# Patient Record
Sex: FEMALE | Race: WHITE | NOT HISPANIC OR LATINO | Employment: OTHER | URBAN - METROPOLITAN AREA
[De-identification: names, ages, dates, MRNs, and addresses within clinical notes are randomized per-mention and may not be internally consistent; named-entity substitution may affect disease eponyms.]

---

## 2021-06-27 ENCOUNTER — HOSPITAL ENCOUNTER (EMERGENCY)
Facility: HOSPITAL | Age: 71
Discharge: HOME | End: 2021-06-27
Attending: EMERGENCY MEDICINE
Payer: COMMERCIAL

## 2021-06-27 VITALS
SYSTOLIC BLOOD PRESSURE: 128 MMHG | WEIGHT: 134 LBS | BODY MASS INDEX: 22.88 KG/M2 | OXYGEN SATURATION: 96 % | DIASTOLIC BLOOD PRESSURE: 68 MMHG | TEMPERATURE: 98.5 F | RESPIRATION RATE: 16 BRPM | HEIGHT: 64 IN

## 2021-06-27 DIAGNOSIS — S81.812A LACERATION OF LEFT LOWER EXTREMITY, INITIAL ENCOUNTER: Primary | ICD-10-CM

## 2021-06-27 PROCEDURE — 99282 EMERGENCY DEPT VISIT SF MDM: CPT | Mod: 25

## 2021-06-27 PROCEDURE — 12001 RPR S/N/AX/GEN/TRNK 2.5CM/<: CPT | Performed by: PHYSICIAN ASSISTANT

## 2021-06-27 PROCEDURE — 0JQP0ZZ REPAIR LEFT LOWER LEG SUBCUTANEOUS TISSUE AND FASCIA, OPEN APPROACH: ICD-10-PCS | Performed by: EMERGENCY MEDICINE

## 2021-06-27 ASSESSMENT — ENCOUNTER SYMPTOMS
NUMBNESS: 0
NAUSEA: 0
FEVER: 0

## 2021-06-27 NOTE — ED ATTESTATION NOTE
The patient was evaluated and managed by the physician assistant / nurse practitioner.     Rogelio Hope MD  06/27/21 9930

## 2021-06-27 NOTE — DISCHARGE INSTRUCTIONS
Keep your wound clean and covered.  The wound can get wet after 24 hours.  Gently cleanse the wound daily with mild soap and water.    If you had sutures placed, follow up in 7 days for suture removal.  You can apply petroleum jelly (Vaseline) to the area once or twice a day.     If you had skin adhesive applied, it will come off on its own. Do not scratch, rub, or pick at the adhesive. You do not need to apply any ointment over the wound.    Return with any signs of infection such as redness, swelling, pus around the wound or any fevers.

## 2021-06-27 NOTE — ED PROVIDER NOTES
Emergency Medicine Note  HPI   HISTORY OF PRESENT ILLNESS     72 y/o female arrived via ems for laceration to left lower leg  Reports was shaving and she knicked a varicose vein and could not get it to stop bleeding  Takes baby aspirin daily.  utd on tetanus      Lower Extremity Issue  Associated symptoms: no fever          Patient History   PAST HISTORY     Reviewed from Nursing Triage: Tobacco  Allergies  Meds  Problems  Med Hx  Surg Hx  Fam Hx  Soc Hx        Past Medical History:   Diagnosis Date   • Anxiety    • Hypertension    • Lipid disorder        History reviewed. No pertinent surgical history.    History reviewed. No pertinent family history.    Social History     Tobacco Use   • Smoking status: Former Smoker   • Smokeless tobacco: Never Used   Substance Use Topics   • Alcohol use: Yes   • Drug use: Never         Review of Systems   REVIEW OF SYSTEMS     Review of Systems   Constitutional: Negative for fever.   Gastrointestinal: Negative for nausea.   Neurological: Negative for numbness.         VITALS     ED Vitals    Date/Time Temp Pulse Resp BP SpO2 [facility]   06/27/21 1011 36.9 °C (98.5 °F) -- 18 133/70 95 % LB                       Physical Exam   PHYSICAL EXAM     Physical Exam  Vitals and nursing note reviewed.   Constitutional:       General: She is not in acute distress.     Appearance: Normal appearance. She is normal weight. She is not ill-appearing, toxic-appearing or diaphoretic.   HENT:      Mouth/Throat:      Mouth: Mucous membranes are moist.   Cardiovascular:      Rate and Rhythm: Normal rate.   Pulmonary:      Effort: No respiratory distress.   Musculoskeletal:         General: No tenderness. Normal range of motion.   Skin:     General: Skin is warm.      Comments: Bleeding from varicose vein  Left lateral calf  Slight ooze  nvt intact distally    Neurological:      Mental Status: She is alert and oriented to person, place, and time.           PROCEDURES     Laceration  Repair    Date/Time: 6/27/2021 11:04 AM  Performed by: Tasneem So PA C  Authorized by: Rogelio Hope MD     Consent:     Consent obtained:  Verbal    Consent given by:  Patient    Risks discussed:  Infection, pain, poor cosmetic result and poor wound healing  Anesthesia (see MAR for exact dosages):     Anesthesia method:  Local infiltration    Local anesthetic:  Lidocaine 2% WITH epi  Laceration details:     Location:  Leg    Leg location:  L lower leg    Length (cm):  0.5  Repair type:     Repair type:  Simple  Pre-procedure details:     Preparation:  Patient was prepped and draped in usual sterile fashion  Exploration:     Wound exploration: wound explored through full range of motion and entire depth of wound probed and visualized      Contaminated: no    Treatment:     Area cleansed with:  Betadine and saline    Amount of cleaning:  Standard    Irrigation solution:  Sterile water  Skin repair:     Repair method:  Sutures    Suture size:  5-0    Suture material:  Nylon    Suture technique:  Figure eight    Number of sutures:  1  Approximation:     Approximation:  Close  Post-procedure details:     Dressing:  Antibiotic ointment and non-adherent dressing    Patient tolerance of procedure:  Tolerated well, no immediate complications         DATA     Results     None          Imaging Results    None         No orders to display       Scoring tools                                 ED Course & MDM   MDM / ED COURSE and CLINICAL IMPRESSIONS     MDM    Clinical Impressions as of Jun 27 1104   Laceration of left lower extremity, initial encounter            Tasneem So PA C  06/27/21 1104

## 2022-06-06 NOTE — PROGRESS NOTES
Cardiology  Office Consult Note         Reason for visit:   Chief Complaint   Patient presents with   • Heart Eval       HPI     Rena Ramirez is a 72 y.o. female who presents to the office for cardiovascular evaluation. PMH: HTN and HLD.     She is followed by her PCP Dr. Meek Yarbrough. Last OV 21.     Lipid panel 21 showed TC: 281, TR, HDL: 101, LDL: 166. Lipid panel from 2020 showed TC: 237, TR, HDL: 113, LDL: 111.     Today, her  has accompanied her to the visit. She reports LLE swelling related to an incident in the past. She reports LLE leg cramps related to the Simvastatin and she reports taking COQ10 along with the Simvastatin. Family history reviewed. She denies any chest pain, palpitations, shortness of breath, dizziness, lightheadedness, near syncope or syncopal episodes. She is unsure of her medication dosages.     Past Medical History:   Diagnosis Date   • Anxiety    • Hypertension    • Lipid disorder        Past Surgical History:   Procedure Laterality Date   • REPLACEMENT TOTAL HIP LATERAL POSITION Right    • TOTAL SHOULDER REPLACEMENT Right         Social History     Tobacco Use   Smoking Status Former Smoker   • Packs/day: 1.00   • Years: 13.   • Pack years: 13.   • Quit date:    • Years since quittin.4   Smokeless Tobacco Never Used   Tobacco Comment    smoked socially     Social History     Tobacco Use   • Smoking status: Former Smoker     Packs/day: 1.00     Years: 13.     Pack years: 13.00     Quit date:      Years since quittin.4   • Smokeless tobacco: Never Used   • Tobacco comment: smoked socially   Substance Use Topics   • Alcohol use: Yes   • Drug use: Never       Family History   Problem Relation Age of Onset   • Heart disease Biological Father    • Heart attack Biological Sister    • Heart disease Biological Sister    • Other Biological Sister         PPM   • Heart disease Biological Brother        Allergies:  Sulfa  (sulfonamide antibiotics)    Current Outpatient Medications   Medication Sig Dispense Refill   • cholecalciferol, vitamin D3, 1,000 unit (25 mcg) tablet Take 1,000 Units by mouth daily.     • coenzyme Q10 (CO Q-10) 100 mg capsule Take 100 mg by mouth daily.     • losartan (COZAAR) 25 mg tablet Take 25 mg by mouth daily.     • pantoprazole (PROTONIX) 20 mg EC tablet Take 20 mg by mouth daily.     • simvastatin (ZOCOR) 10 mg tablet Take 10 mg by mouth nightly.       No current facility-administered medications for this visit.       Review of Systems   Cardiovascular: Positive for leg swelling. Negative for chest pain, dyspnea on exertion, irregular heartbeat, near-syncope, palpitations and syncope.   Respiratory: Negative for shortness of breath.    Neurological: Negative for dizziness and light-headedness.       Objective     Vitals:    06/09/22 1028   BP: 118/80   Pulse: 85   SpO2: 97%       Wt Readings from Last 1 Encounters:   06/09/22 62.1 kg (137 lb)       Body surface area is 1.67 meters squared.    Body mass index is 23.52 kg/m².    Physical Exam  Constitutional:       Appearance: Normal appearance.   Neck:      Vascular: No carotid bruit or JVD.   Cardiovascular:      Rate and Rhythm: Normal rate and regular rhythm.      Pulses: Normal pulses.      Heart sounds: Normal heart sounds. No murmur heard.  Pulmonary:      Effort: Pulmonary effort is normal. No respiratory distress.      Breath sounds: Normal breath sounds.   Musculoskeletal:      Left lower leg: Edema present.   Skin:     General: Skin is warm and dry.   Neurological:      General: No focal deficit present.      Mental Status: She is alert and oriented to person, place, and time.           ECG   Sinus  Rhythm   WITHIN NORMAL LIMITS, not significantly changed from ECG from 10/1/2017      Hematology  Lab Results   Component Value Date    WBC 5.72 10/12/2017    HGB 8.2 (L) 10/12/2017    HCT 25.3 (L) 10/12/2017     (H) 10/12/2017    INR 1.0  10/01/2017       Chemistries  Lab Results   Component Value Date     10/12/2017    K 4.1 10/12/2017     10/12/2017    CREATININE 0.6 10/12/2017    BUN 14 10/12/2017    CO2 29 10/12/2017    GLUCOSE 88 10/12/2017    CALCIUM 9.0 10/12/2017    MG 1.9 10/08/2017    ALT 15 10/04/2017    AST 31 10/04/2017       Cholesterol  No results found for: CHOL, TRIG, HDL, LDLCALC, NONHDLCALC    Endocrine  Lab Results   Component Value Date    TSH 1.07 10/12/2017       Assessment/Plan     Other hyperlipidemia  Last lipid profile available to me from 2021 the LDL was 166.  I discussed this finding with patient.  Patient was counseled regarding the importance of lipid control in the process of atherosclerosis and was counseled to follow a cardiac diet.  I discussed with patient importance of decreasing the LDL to prevent cardiovascular events.  She was on higher doses of simvastatin but she developed muscle cramps.  I discussed with patient about switching to atorvastatin and she agrees, based on the LDL level will decide what dose.  A pamphlet from the National Lipid Association with lifestyle tips to decrease the LDL cholesterol was given to patient.    Shortness of breath  She reports occasional shortness of breath with going up the stairs.  I found in her records a history of pulmonary hypertension.  She had a sister that  of a cardiomyopathy.  There are no clinical findings consistent with decompensated heart failure at this time.  Recommend checking an echocardiogram to assess LV function, cardiac valves and pulmonary pressure.    Family history of coronary artery disease  I discussed with patient that this is a cardiac risk factor.  We discussed about heart disease and cardiac risk factors.  Patient was extensively counseled regarding lifestyle changes and risk factor modifications. Patient understands that noncompliance with these recommendations may increase the risk of cardiovascular complications.   Advised patient to minimize the use of alcohol.  Recommend follow a cardiac diet and exercise as tolerated.        Orders Placed This Encounter   Procedures   • Magnesium     Standing Status:   Future     Standing Expiration Date:   6/9/2023     Order Specific Question:   Release to patient     Answer:   Immediate   • Comprehensive metabolic panel     Standing Status:   Future     Standing Expiration Date:   6/9/2023     Order Specific Question:   Release to patient     Answer:   Immediate   • Lipid panel     Standing Status:   Future     Standing Expiration Date:   6/9/2023     Order Specific Question:   Release to patient     Answer:   Immediate   • ECG 12 lead     Scheduling Instructions:      PLEASE USE THIS ORDER FOR ECG'S PERFORMED IN PHYSICIAN OFFICES     Order Specific Question:   Release to patient     Answer:   Immediate   • Transthoracic echo (TTE) complete     With next visit     Standing Status:   Future     Standing Expiration Date:   6/9/2024     Order Specific Question:   Is Definity and/or agitated saline (bubbles) indicated for the study?     Answer:   No     Order Specific Question:   Release to patient     Answer:   Immediate              This note was completed in part utilizing a voice recognition software. Grammatical errors, random word insertion, spelling mistakes, and incomplete sentences may be an occasional consequence of the system secondary to software limitations, ambient noise and hardware issues.   Please read the chart carefully and recognize, using context, where substitutions have occurred. If you have any questions or concerns about the context, text or information contained within the body of this dictation, please contact myself, the provider, for further clarification.     IMeek, am scribing for, and in the presence of, Kana Karimi MD.     IKana MD, personally performed the services described in this documentation as scribed by Meek Mora in my  presence, and it is both accurate and complete.     Kana Karimi MD  6/9/2022

## 2022-06-09 ENCOUNTER — OFFICE VISIT (OUTPATIENT)
Dept: CARDIOLOGY | Facility: CLINIC | Age: 72
End: 2022-06-09
Payer: COMMERCIAL

## 2022-06-09 ENCOUNTER — TELEPHONE (OUTPATIENT)
Dept: CARDIOLOGY | Facility: CLINIC | Age: 72
End: 2022-06-09

## 2022-06-09 VITALS
WEIGHT: 137 LBS | SYSTOLIC BLOOD PRESSURE: 118 MMHG | BODY MASS INDEX: 23.39 KG/M2 | OXYGEN SATURATION: 97 % | DIASTOLIC BLOOD PRESSURE: 80 MMHG | HEART RATE: 85 BPM | HEIGHT: 64 IN

## 2022-06-09 DIAGNOSIS — R06.02 SHORTNESS OF BREATH: ICD-10-CM

## 2022-06-09 DIAGNOSIS — Z82.49 FAMILY HISTORY OF CORONARY ARTERY DISEASE: ICD-10-CM

## 2022-06-09 DIAGNOSIS — E78.49 OTHER HYPERLIPIDEMIA: Primary | ICD-10-CM

## 2022-06-09 PROCEDURE — 3008F BODY MASS INDEX DOCD: CPT | Performed by: INTERNAL MEDICINE

## 2022-06-09 PROCEDURE — 99204 OFFICE O/P NEW MOD 45 MIN: CPT | Performed by: INTERNAL MEDICINE

## 2022-06-09 PROCEDURE — 93000 ELECTROCARDIOGRAM COMPLETE: CPT | Performed by: INTERNAL MEDICINE

## 2022-06-09 RX ORDER — PANTOPRAZOLE SODIUM 20 MG/1
20 TABLET, DELAYED RELEASE ORAL DAILY
COMMUNITY
End: 2023-06-21 | Stop reason: ALTCHOICE

## 2022-06-09 RX ORDER — CHOLECALCIFEROL (VITAMIN D3) 25 MCG
1000 TABLET ORAL DAILY
COMMUNITY
End: 2025-01-08 | Stop reason: ALTCHOICE

## 2022-06-09 RX ORDER — SIMVASTATIN 10 MG/1
10 TABLET, FILM COATED ORAL NIGHTLY
COMMUNITY
End: 2023-01-04

## 2022-06-09 RX ORDER — UBIDECARENONE 100 MG
100 CAPSULE ORAL DAILY
COMMUNITY
End: 2025-01-08 | Stop reason: ALTCHOICE

## 2022-06-09 RX ORDER — LOSARTAN POTASSIUM 25 MG/1
25 TABLET ORAL DAILY
COMMUNITY
End: 2023-06-21 | Stop reason: ALTCHOICE

## 2022-06-09 ASSESSMENT — ENCOUNTER SYMPTOMS
SHORTNESS OF BREATH: 0
DYSPNEA ON EXERTION: 0
SYNCOPE: 0
PALPITATIONS: 0
NEAR-SYNCOPE: 0
DIZZINESS: 0
IRREGULAR HEARTBEAT: 0
LIGHT-HEADEDNESS: 0

## 2022-06-09 NOTE — ASSESSMENT & PLAN NOTE
She reports occasional shortness of breath with going up the stairs.  I found in her records a history of pulmonary hypertension.  She had a sister that  of a cardiomyopathy.  There are no clinical findings consistent with decompensated heart failure at this time.  Recommend checking an echocardiogram to assess LV function, cardiac valves and pulmonary pressure.

## 2022-06-09 NOTE — ASSESSMENT & PLAN NOTE
Last lipid profile available to me from 2/25/2021 the LDL was 166.  I discussed this finding with patient.  Patient was counseled regarding the importance of lipid control in the process of atherosclerosis and was counseled to follow a cardiac diet.  I discussed with patient importance of decreasing the LDL to prevent cardiovascular events.  She was on higher doses of simvastatin but she developed muscle cramps.  I discussed with patient about switching to atorvastatin and she agrees, based on the LDL level will decide what dose.  A pamphlet from the National Lipid Association with lifestyle tips to decrease the LDL cholesterol was given to patient.

## 2022-06-09 NOTE — ASSESSMENT & PLAN NOTE
I discussed with patient that this is a cardiac risk factor.  We discussed about heart disease and cardiac risk factors.  Patient was extensively counseled regarding lifestyle changes and risk factor modifications. Patient understands that noncompliance with these recommendations may increase the risk of cardiovascular complications.  Advised patient to minimize the use of alcohol.  Recommend follow a cardiac diet and exercise as tolerated.

## 2022-06-19 NOTE — TELEPHONE ENCOUNTER
Echo: Added to future files   
Echo: NPR per Plan  
Pt is coming to the [place] office on 7/11 for an Echo dx R06.02  
Statement Selected

## 2022-07-11 ENCOUNTER — HOSPITAL ENCOUNTER (OUTPATIENT)
Dept: CARDIOLOGY | Facility: CLINIC | Age: 72
Discharge: HOME | End: 2022-07-11
Attending: INTERNAL MEDICINE
Payer: COMMERCIAL

## 2022-07-11 VITALS
DIASTOLIC BLOOD PRESSURE: 80 MMHG | SYSTOLIC BLOOD PRESSURE: 118 MMHG | WEIGHT: 137 LBS | HEIGHT: 64 IN | BODY MASS INDEX: 23.39 KG/M2

## 2022-07-11 DIAGNOSIS — R06.02 SHORTNESS OF BREATH: ICD-10-CM

## 2022-07-11 LAB
AORTIC ROOT ANNULUS: 2.7 CM
ASCENDING AORTA: 3 CM
AV PEAK GRADIENT: 5 MMHG
AV PEAK VELOCITY-S: 1.12 M/S
AV VALVE AREA: 2 CM2
BSA FOR ECHO PROCEDURE: 1.68 M2
CUSP SEPARATION: 1.6 CM
E WAVE DECELERATION TIME: 183 MS
E/A RATIO: 0.6
E/E' RATIO: 8.5
E/LAT E' RATIO: 5.8
EDV (BP): 54.5 CM3
EF (A4C): 62.8 %
EF A2C: 60.1 %
EJECTION FRACTION: 61.7 %
EST RIGHT VENT SYSTOLIC PRESSURE BY TRICUSPID REGURGITATION JET: 32 MMHG
ESV (BP): 20.9 CM3
FRACTIONAL SHORTENING: 27 %
INTERVENTRICULAR SEPTUM: 0.87 CM
LA ESV (BP): 40.9 CM3
LA ESV INDEX (A2C): 21.9 CM3/M2
LA ESV INDEX (BP): 24.35 CM3/M2
LA/AORTA RATIO: 1.15
LAAS-AP2: 14.6 CM2
LAAS-AP4: 15.9 CM2
LAD 2D: 3.1 CM
LALD A4C: 4.56 CM
LALD A4C: 4.78 CM
LAV-S: 36.8 CM3
LEFT ATRIUM VOLUME INDEX: 25.89 CM3/M2
LEFT ATRIUM VOLUME: 43.5 CM3
LEFT INTERNAL DIMENSION IN SYSTOLE: 2.92 CM (ref 2.35–3.56)
LEFT VENTRICLE DIASTOLIC VOLUME INDEX: 30.42 CM3/M2
LEFT VENTRICLE DIASTOLIC VOLUME: 51.1 CM3
LEFT VENTRICLE SYSTOLIC VOLUME INDEX: 11.37 CM3/M2
LEFT VENTRICLE SYSTOLIC VOLUME: 19.1 CM3
LEFT VENTRICULAR INTERNAL DIMENSION IN DIASTOLE: 4 CM (ref 3.96–5.5)
LEFT VENTRICULAR POSTERIOR WALL IN END DIASTOLE: 0.88 CM (ref 0.52–0.96)
LV DIASTOLIC VOLUME: 54.7 CM3
LV ESV (APICAL 2 CHAMBER): 21.8 CM3
LVAD-AP2: 21.2 CM2
LVAD-AP4: 20.2 CM2
LVAS-AP2: 12.3 CM2
LVAS-AP4: 11.1 CM2
LVEDVI(A2C): 32.56 CM3/M2
LVEDVI(BP): 32.44 CM3/M2
LVESVI(A2C): 12.98 CM3/M2
LVESVI(BP): 12.44 CM3/M2
LVLD-AP2: 7.09 CM
LVLD-AP4: 6.65 CM
LVLS-AP2: 5.97 CM
LVLS-AP4: 5.65 CM
LVOT 2D: 1.8 CM
LVOT A: 2.54 CM2
LVOT PEAK VELOCITY: 0.88 M/S
MV E'TISSUE VEL-LAT: 0.09 M/S
MV E'TISSUE VEL-MED: 0.06 M/S
MV PEAK A VEL: 0.97 M/S
MV PEAK E VEL: 0.54 M/S
MV VALVE AREA P 1/2 METHOD: 4.07 CM2
POSTERIOR WALL: 0.88 CM
PV PEAK GRADIENT: 7 MMHG
PV PV: 1.34 M/S
RAP: 10 MMHG
RVOT VMAX: 0.64 M/S
SEPTAL TISSUE DOPPLER FREE WALL LATE DIA VELOCITY (APICAL 4 CHAMBER VIEW): 0.11 M/S
TR MAX PG: 29 MMHG
TRICUSPID VALVE PEAK REGURGITATION VELOCITY: 2.68 M/S
Z-SCORE OF LEFT VENTRICULAR DIMENSION IN END DIASTOLE: -1.54
Z-SCORE OF LEFT VENTRICULAR DIMENSION IN END SYSTOLE: 0.07
Z-SCORE OF LEFT VENTRICULAR POSTERIOR WALL IN END DIASTOLE: 1.17

## 2022-07-11 PROCEDURE — 93306 TTE W/DOPPLER COMPLETE: CPT | Performed by: INTERNAL MEDICINE

## 2022-07-11 NOTE — RESULT ENCOUNTER NOTE
Can you please let the patient know that there were no major abnormalities on the echocardiogram.  Her heart function is normal and the pulmonary pressures appear to be normal also.  We will discuss in more detail during our next visit.

## 2022-08-23 ENCOUNTER — APPOINTMENT (EMERGENCY)
Dept: RADIOLOGY | Facility: HOSPITAL | Age: 72
DRG: 914 | End: 2022-08-23
Attending: EMERGENCY MEDICINE
Payer: COMMERCIAL

## 2022-08-23 ENCOUNTER — HOSPITAL ENCOUNTER (INPATIENT)
Facility: HOSPITAL | Age: 72
LOS: 2 days | Discharge: HOME | DRG: 914 | End: 2022-08-25
Attending: EMERGENCY MEDICINE | Admitting: HOSPITALIST
Payer: COMMERCIAL

## 2022-08-23 DIAGNOSIS — S09.90XA HEAD INJURY, INITIAL ENCOUNTER: ICD-10-CM

## 2022-08-23 DIAGNOSIS — J18.9 PNEUMONIA DUE TO INFECTIOUS ORGANISM, UNSPECIFIED LATERALITY, UNSPECIFIED PART OF LUNG: ICD-10-CM
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
## 2022-08-23 PROBLEM — F41.9 ANXIETY: Status: ACTIVE | Noted: 2022-08-23

## 2022-08-23 PROBLEM — I10 PRIMARY HYPERTENSION: Status: ACTIVE | Noted: 2022-08-23

## 2022-08-23 PROBLEM — F10.20 ALCOHOLISM (CMS/HCC): Status: ACTIVE | Noted: 2022-08-23

## 2022-08-23 LAB
ALBUMIN SERPL-MCNC: 3.8 G/DL (ref 3.4–5)
ALP SERPL-CCNC: 73 IU/L (ref 35–126)
ALT SERPL-CCNC: 19 IU/L (ref 11–54)
ANION GAP SERPL CALC-SCNC: 7 MEQ/L (ref 3–15)
ANION GAP SERPL CALC-SCNC: 8 MEQ/L (ref 3–15)
APTT PPP: 25 SEC (ref 23–35)
AST SERPL-CCNC: 29 IU/L (ref 15–41)
ATRIAL RATE: 85
BASOPHILS # BLD: 0.03 K/UL (ref 0.01–0.1)
BASOPHILS NFR BLD: 0.3 %
BILIRUB DIRECT SERPL-MCNC: 0.1 MG/DL
BILIRUB SERPL-MCNC: 0.5 MG/DL (ref 0.3–1.2)
BILIRUB UR QL STRIP.AUTO: NEGATIVE MG/DL
BUN SERPL-MCNC: 13 MG/DL (ref 8–20)
BUN SERPL-MCNC: 14 MG/DL (ref 8–20)
CALCIUM SERPL-MCNC: 9 MG/DL (ref 8.9–10.3)
CALCIUM SERPL-MCNC: 9.1 MG/DL (ref 8.9–10.3)
CHLORIDE SERPL-SCNC: 103 MEQ/L (ref 98–109)
CHLORIDE SERPL-SCNC: 103 MEQ/L (ref 98–109)
CLARITY UR REFRACT.AUTO: CLEAR
CO2 SERPL-SCNC: 23 MEQ/L (ref 22–32)
CO2 SERPL-SCNC: 25 MEQ/L (ref 22–32)
COLOR UR AUTO: COLORLESS
CREAT SERPL-MCNC: 0.7 MG/DL (ref 0.6–1.1)
CREAT SERPL-MCNC: 0.8 MG/DL (ref 0.6–1.1)
DIFFERENTIAL METHOD BLD: ABNORMAL
EOSINOPHIL # BLD: 0.06 K/UL (ref 0.04–0.36)
EOSINOPHIL NFR BLD: 0.5 %
ERYTHROCYTE [DISTWIDTH] IN BLOOD BY AUTOMATED COUNT: 12.4 % (ref 11.7–14.4)
ERYTHROCYTE [DISTWIDTH] IN BLOOD BY AUTOMATED COUNT: 12.4 % (ref 11.7–14.4)
ETHANOL SERPL-MCNC: 153 MG/DL
FOLATE SERPL-MCNC: >20 NG/ML
GFR SERPL CREATININE-BSD FRML MDRD: >60 ML/MIN/1.73M*2
GFR SERPL CREATININE-BSD FRML MDRD: >60 ML/MIN/1.73M*2
GLUCOSE SERPL-MCNC: 100 MG/DL (ref 70–99)
GLUCOSE SERPL-MCNC: 102 MG/DL (ref 70–99)
GLUCOSE UR STRIP.AUTO-MCNC: NEGATIVE MG/DL
HCT VFR BLDCO AUTO: 38.4 % (ref 35–45)
HCT VFR BLDCO AUTO: 38.9 % (ref 35–45)
HGB BLD-MCNC: 12.8 G/DL (ref 11.8–15.7)
HGB BLD-MCNC: 12.9 G/DL (ref 11.8–15.7)
HGB UR QL STRIP.AUTO: NEGATIVE
IMM GRANULOCYTES # BLD AUTO: 0.05 K/UL (ref 0–0.08)
IMM GRANULOCYTES NFR BLD AUTO: 0.4 %
INR PPP: 1.1
KETONES UR STRIP.AUTO-MCNC: NEGATIVE MG/DL
LEUKOCYTE ESTERASE UR QL STRIP.AUTO: NEGATIVE
LIPASE SERPL-CCNC: 27 U/L (ref 20–51)
LYMPHOCYTES # BLD: 1.08 K/UL (ref 1.2–3.5)
LYMPHOCYTES NFR BLD: 9.7 %
MAGNESIUM SERPL-MCNC: 1.9 MG/DL (ref 1.8–2.5)
MCH RBC QN AUTO: 32.6 PG (ref 28–33.2)
MCH RBC QN AUTO: 32.9 PG (ref 28–33.2)
MCHC RBC AUTO-ENTMCNC: 33.2 G/DL (ref 32.2–35.5)
MCHC RBC AUTO-ENTMCNC: 33.3 G/DL (ref 32.2–35.5)
MCV RBC AUTO: 98.2 FL (ref 83–98)
MCV RBC AUTO: 98.7 FL (ref 83–98)
MONOCYTES # BLD: 0.78 K/UL (ref 0.28–0.8)
MONOCYTES NFR BLD: 7 %
NEUTROPHILS # BLD: 9.15 K/UL (ref 1.7–7)
NEUTS SEG NFR BLD: 82.1 %
NITRITE UR QL STRIP.AUTO: NEGATIVE
NRBC BLD-RTO: 0 %
P AXIS: 65
PDW BLD AUTO: 8.8 FL (ref 9.4–12.3)
PDW BLD AUTO: 9 FL (ref 9.4–12.3)
PH UR STRIP.AUTO: 6 [PH]
PLATELET # BLD AUTO: 200 K/UL (ref 150–369)
PLATELET # BLD AUTO: 213 K/UL (ref 150–369)
POTASSIUM SERPL-SCNC: 4 MEQ/L (ref 3.6–5.1)
POTASSIUM SERPL-SCNC: 4 MEQ/L (ref 3.6–5.1)
PR INTERVAL: 152
PROT SERPL-MCNC: 7.7 G/DL (ref 6–8.2)
PROT UR QL STRIP.AUTO: NEGATIVE
PROTHROMBIN TIME: 13.9 SEC (ref 12.2–14.5)
QRS DURATION: 78
QT INTERVAL: 388
QTC CALCULATION(BAZETT): 461
R AXIS: 50
RBC # BLD AUTO: 3.89 M/UL (ref 3.93–5.22)
RBC # BLD AUTO: 3.96 M/UL (ref 3.93–5.22)
SARS-COV-2 RNA RESP QL NAA+PROBE: NEGATIVE
SODIUM SERPL-SCNC: 134 MEQ/L (ref 136–144)
SODIUM SERPL-SCNC: 135 MEQ/L (ref 136–144)
SP GR UR REFRACT.AUTO: 1.01
T WAVE AXIS: 48
TROPONIN I SERPL HS-MCNC: 2.9 PG/ML
TSH SERPL DL<=0.05 MIU/L-ACNC: 1.23 MIU/L (ref 0.34–5.6)
UROBILINOGEN UR STRIP-ACNC: 0.2 EU/DL
VENTRICULAR RATE: 85
VIT B12 SERPL-MCNC: 261 PG/ML (ref 180–914)
WBC # BLD AUTO: 11.15 K/UL (ref 3.8–10.5)
WBC # BLD AUTO: 7.39 K/UL (ref 3.8–10.5)

## 2022-08-23 PROCEDURE — 63700000 HC SELF-ADMINISTRABLE DRUG: Performed by: HOSPITALIST

## 2022-08-23 PROCEDURE — 83735 ASSAY OF MAGNESIUM: CPT | Performed by: HOSPITALIST

## 2022-08-23 PROCEDURE — G0480 DRUG TEST DEF 1-7 CLASSES: HCPCS | Performed by: EMERGENCY MEDICINE

## 2022-08-23 PROCEDURE — 63600000 HC DRUGS/DETAIL CODE: Performed by: HOSPITALIST

## 2022-08-23 PROCEDURE — 85730 THROMBOPLASTIN TIME PARTIAL: CPT | Performed by: EMERGENCY MEDICINE

## 2022-08-23 PROCEDURE — 99223 1ST HOSP IP/OBS HIGH 75: CPT | Performed by: HOSPITALIST

## 2022-08-23 PROCEDURE — 82248 BILIRUBIN DIRECT: CPT | Performed by: EMERGENCY MEDICINE

## 2022-08-23 PROCEDURE — 82652 VIT D 1 25-DIHYDROXY: CPT | Performed by: HOSPITALIST

## 2022-08-23 PROCEDURE — 63700000 HC SELF-ADMINISTRABLE DRUG: Performed by: EMERGENCY MEDICINE

## 2022-08-23 PROCEDURE — 71045 X-RAY EXAM CHEST 1 VIEW: CPT

## 2022-08-23 PROCEDURE — 85610 PROTHROMBIN TIME: CPT | Performed by: EMERGENCY MEDICINE

## 2022-08-23 PROCEDURE — U0002 COVID-19 LAB TEST NON-CDC: HCPCS | Performed by: EMERGENCY MEDICINE

## 2022-08-23 PROCEDURE — 82746 ASSAY OF FOLIC ACID SERUM: CPT | Performed by: HOSPITALIST

## 2022-08-23 PROCEDURE — 72125 CT NECK SPINE W/O DYE: CPT | Mod: ME

## 2022-08-23 PROCEDURE — 82607 VITAMIN B-12: CPT | Performed by: HOSPITALIST

## 2022-08-23 PROCEDURE — 97530 THERAPEUTIC ACTIVITIES: CPT | Mod: GP | Performed by: PHYSICAL THERAPIST

## 2022-08-23 PROCEDURE — 83690 ASSAY OF LIPASE: CPT | Performed by: EMERGENCY MEDICINE

## 2022-08-23 PROCEDURE — 80048 BASIC METABOLIC PNL TOTAL CA: CPT | Performed by: HOSPITALIST

## 2022-08-23 PROCEDURE — 85025 COMPLETE CBC W/AUTO DIFF WBC: CPT | Performed by: EMERGENCY MEDICINE

## 2022-08-23 PROCEDURE — 25800000 HC PHARMACY IV SOLUTIONS: Performed by: HOSPITALIST

## 2022-08-23 PROCEDURE — 84443 ASSAY THYROID STIM HORMONE: CPT | Performed by: HOSPITALIST

## 2022-08-23 PROCEDURE — 80053 COMPREHEN METABOLIC PANEL: CPT | Performed by: EMERGENCY MEDICINE

## 2022-08-23 PROCEDURE — 93005 ELECTROCARDIOGRAM TRACING: CPT | Performed by: EMERGENCY MEDICINE

## 2022-08-23 PROCEDURE — 25000000 HC PHARMACY GENERAL: Performed by: HOSPITALIST

## 2022-08-23 PROCEDURE — 85027 COMPLETE CBC AUTOMATED: CPT | Performed by: HOSPITALIST

## 2022-08-23 PROCEDURE — 84484 ASSAY OF TROPONIN QUANT: CPT | Performed by: EMERGENCY MEDICINE

## 2022-08-23 PROCEDURE — 96374 THER/PROPH/DIAG INJ IV PUSH: CPT | Mod: 59

## 2022-08-23 PROCEDURE — 25800000 HC PHARMACY IV SOLUTIONS: Performed by: EMERGENCY MEDICINE

## 2022-08-23 PROCEDURE — 63600000 HC DRUGS/DETAIL CODE: Performed by: EMERGENCY MEDICINE

## 2022-08-23 PROCEDURE — 87040 BLOOD CULTURE FOR BACTERIA: CPT | Performed by: EMERGENCY MEDICINE

## 2022-08-23 PROCEDURE — 20600000 HC ROOM AND CARE INTERMEDIATE/TELEMETRY

## 2022-08-23 PROCEDURE — 200200 PR NO CHARGE: Performed by: HOSPITALIST

## 2022-08-23 PROCEDURE — 81003 URINALYSIS AUTO W/O SCOPE: CPT | Performed by: HOSPITALIST

## 2022-08-23 PROCEDURE — 70450 CT HEAD/BRAIN W/O DYE: CPT | Mod: ME

## 2022-08-23 PROCEDURE — 97162 PT EVAL MOD COMPLEX 30 MIN: CPT | Mod: GP | Performed by: PHYSICAL THERAPIST

## 2022-08-23 PROCEDURE — 85025 COMPLETE CBC W/AUTO DIFF WBC: CPT

## 2022-08-23 PROCEDURE — 36415 COLL VENOUS BLD VENIPUNCTURE: CPT

## 2022-08-23 PROCEDURE — 93005 ELECTROCARDIOGRAM TRACING: CPT

## 2022-08-23 PROCEDURE — 99285 EMERGENCY DEPT VISIT HI MDM: CPT | Mod: 25

## 2022-08-23 RX ORDER — ARTIFICIAL TEARS 1; 2; 3 MG/ML; MG/ML; MG/ML
2 SOLUTION/ DROPS OPHTHALMIC 3 TIMES DAILY
Status: DISCONTINUED | OUTPATIENT
Start: 2022-08-23 | End: 2022-08-25 | Stop reason: HOSPADM

## 2022-08-23 RX ORDER — CHOLECALCIFEROL (VITAMIN D3) 25 MCG
1000 TABLET ORAL DAILY
Status: DISCONTINUED | OUTPATIENT
Start: 2022-08-23 | End: 2022-08-25 | Stop reason: HOSPADM

## 2022-08-23 RX ORDER — PANTOPRAZOLE SODIUM 40 MG/1
40 TABLET, DELAYED RELEASE ORAL DAILY
Status: DISCONTINUED | OUTPATIENT
Start: 2022-08-23 | End: 2022-08-25 | Stop reason: HOSPADM

## 2022-08-23 RX ORDER — LOSARTAN POTASSIUM 25 MG/1
25 TABLET ORAL DAILY
Status: DISCONTINUED | OUTPATIENT
Start: 2022-08-23 | End: 2022-08-25 | Stop reason: HOSPADM

## 2022-08-23 RX ORDER — PRAVASTATIN SODIUM 20 MG/1
20 TABLET ORAL DAILY
Status: DISCONTINUED | OUTPATIENT
Start: 2022-08-23 | End: 2022-08-25 | Stop reason: HOSPADM

## 2022-08-23 RX ORDER — VIT C/E/ZN/COPPR/LUTEIN/ZEAXAN 250MG-90MG
1000 CAPSULE ORAL DAILY
Status: DISCONTINUED | OUTPATIENT
Start: 2022-08-26 | End: 2022-08-25 | Stop reason: HOSPADM

## 2022-08-23 RX ORDER — ACETAMINOPHEN 325 MG/1
TABLET ORAL
Status: DISPENSED
Start: 2022-08-23 | End: 2022-08-23

## 2022-08-23 RX ORDER — THIAMINE HCL 100 MG
100 TABLET ORAL DAILY
Status: DISCONTINUED | OUTPATIENT
Start: 2022-08-23 | End: 2022-08-23

## 2022-08-23 RX ORDER — ALPRAZOLAM 1 MG/1
1 TABLET ORAL
Status: DISCONTINUED | OUTPATIENT
Start: 2022-08-23 | End: 2022-08-24

## 2022-08-23 RX ORDER — CYANOCOBALAMIN 1000 UG/ML
1000 INJECTION, SOLUTION INTRAMUSCULAR; SUBCUTANEOUS DAILY
Status: COMPLETED | OUTPATIENT
Start: 2022-08-23 | End: 2022-08-25

## 2022-08-23 RX ORDER — THIAMINE HCL 100 MG
100 TABLET ORAL DAILY
Status: DISCONTINUED | OUTPATIENT
Start: 2022-08-26 | End: 2022-08-25 | Stop reason: HOSPADM

## 2022-08-23 RX ORDER — FOLIC ACID 1 MG/1
1 TABLET ORAL DAILY
Status: DISCONTINUED | OUTPATIENT
Start: 2022-08-26 | End: 2022-08-25 | Stop reason: HOSPADM

## 2022-08-23 RX ORDER — ONDANSETRON HYDROCHLORIDE 2 MG/ML
4 INJECTION, SOLUTION INTRAVENOUS EVERY 8 HOURS PRN
Status: DISCONTINUED | OUTPATIENT
Start: 2022-08-23 | End: 2022-08-25

## 2022-08-23 RX ORDER — ACETAMINOPHEN 325 MG/1
650 TABLET ORAL ONCE
Status: COMPLETED | OUTPATIENT
Start: 2022-08-23 | End: 2022-08-23

## 2022-08-23 RX ORDER — ALPRAZOLAM 1 MG/1
1 TABLET ORAL
Status: ON HOLD | COMMUNITY
Start: 2022-06-30 | End: 2022-08-25 | Stop reason: SDUPTHER

## 2022-08-23 RX ORDER — LORAZEPAM 1 MG/1
1 TABLET ORAL EVERY 2 HOUR PRN
Status: DISCONTINUED | OUTPATIENT
Start: 2022-08-23 | End: 2022-08-25

## 2022-08-23 RX ORDER — LORAZEPAM 0.5 MG/1
0.5 TABLET ORAL
Status: DISCONTINUED | OUTPATIENT
Start: 2022-08-23 | End: 2022-08-25

## 2022-08-23 RX ADMIN — PRAVASTATIN SODIUM 20 MG: 20 TABLET ORAL at 18:00

## 2022-08-23 RX ADMIN — CYANOCOBALAMIN 1000 MCG: 1000 INJECTION, SOLUTION INTRAMUSCULAR; SUBCUTANEOUS at 16:31

## 2022-08-23 RX ADMIN — LOSARTAN POTASSIUM 25 MG: 25 TABLET, FILM COATED ORAL at 18:00

## 2022-08-23 RX ADMIN — AZITHROMYCIN MONOHYDRATE 500 MG: 500 INJECTION, POWDER, LYOPHILIZED, FOR SOLUTION INTRAVENOUS at 05:52

## 2022-08-23 RX ADMIN — Medication 1000 UNITS: at 18:00

## 2022-08-23 RX ADMIN — THIAMINE HYDROCHLORIDE 400 MG: 100 INJECTION, SOLUTION INTRAMUSCULAR; INTRAVENOUS at 16:19

## 2022-08-23 RX ADMIN — THIAMINE HYDROCHLORIDE 400 MG: 100 INJECTION, SOLUTION INTRAMUSCULAR; INTRAVENOUS at 23:19

## 2022-08-23 RX ADMIN — FOLIC ACID 1 MG: 5 INJECTION, SOLUTION INTRAMUSCULAR; INTRAVENOUS; SUBCUTANEOUS at 09:10

## 2022-08-23 RX ADMIN — THIAMINE HYDROCHLORIDE 400 MG: 100 INJECTION, SOLUTION INTRAMUSCULAR; INTRAVENOUS at 09:10

## 2022-08-23 RX ADMIN — DEXTRAN 70, GLYCERIN, HYPROMELLOSE 2 DROP: 1; 2; 3 SOLUTION/ DROPS OPHTHALMIC at 16:11

## 2022-08-23 RX ADMIN — CEFTRIAXONE SODIUM 1 G: 1 INJECTION, POWDER, FOR SOLUTION INTRAMUSCULAR; INTRAVENOUS at 05:44

## 2022-08-23 RX ADMIN — PANTOPRAZOLE SODIUM 40 MG: 40 TABLET, DELAYED RELEASE ORAL at 18:00

## 2022-08-23 RX ADMIN — THIAMINE HCL TAB 100 MG 100 MG: 100 TAB at 07:46

## 2022-08-23 RX ADMIN — DEXTRAN 70, GLYCERIN, HYPROMELLOSE 2 DROP: 1; 2; 3 SOLUTION/ DROPS OPHTHALMIC at 19:48

## 2022-08-23 RX ADMIN — ACETAMINOPHEN 650 MG: 325 TABLET ORAL at 05:52

## 2022-08-23 RX ADMIN — DEXTRAN 70, GLYCERIN, HYPROMELLOSE 2 DROP: 1; 2; 3 SOLUTION/ DROPS OPHTHALMIC at 09:23

## 2022-08-23 RX ADMIN — ALPRAZOLAM 1 MG: 1 TABLET ORAL at 18:00

## 2022-08-24 PROCEDURE — 25800000 HC PHARMACY IV SOLUTIONS: Performed by: HOSPITALIST

## 2022-08-24 PROCEDURE — 25000000 HC PHARMACY GENERAL: Performed by: HOSPITALIST

## 2022-08-24 PROCEDURE — 63700000 HC SELF-ADMINISTRABLE DRUG: Performed by: HOSPITALIST

## 2022-08-24 PROCEDURE — 12000000 HC ROOM AND CARE MED/SURG

## 2022-08-24 PROCEDURE — 63600000 HC DRUGS/DETAIL CODE: Performed by: HOSPITALIST

## 2022-08-24 PROCEDURE — 99232 SBSQ HOSP IP/OBS MODERATE 35: CPT | Performed by: HOSPITALIST

## 2022-08-24 RX ORDER — ALPRAZOLAM 0.25 MG/1
0.5 TABLET ORAL 3 TIMES DAILY
Status: DISCONTINUED | OUTPATIENT
Start: 2022-08-24 | End: 2022-08-25 | Stop reason: HOSPADM

## 2022-08-24 RX ORDER — MELATONIN 5 MG
5 CAPSULE ORAL NIGHTLY
Status: DISCONTINUED | OUTPATIENT
Start: 2022-08-24 | End: 2022-08-25 | Stop reason: HOSPADM

## 2022-08-24 RX ORDER — ALPRAZOLAM 1 MG/1
1 TABLET ORAL 3 TIMES DAILY
Status: DISCONTINUED | OUTPATIENT
Start: 2022-08-24 | End: 2022-08-24

## 2022-08-24 RX ORDER — HEPARIN SODIUM 5000 [USP'U]/ML
5000 INJECTION, SOLUTION INTRAVENOUS; SUBCUTANEOUS EVERY 8 HOURS
Status: DISCONTINUED | OUTPATIENT
Start: 2022-08-24 | End: 2022-08-25 | Stop reason: HOSPADM

## 2022-08-24 RX ADMIN — FOLIC ACID 1 MG: 5 INJECTION, SOLUTION INTRAMUSCULAR; INTRAVENOUS; SUBCUTANEOUS at 08:35

## 2022-08-24 RX ADMIN — THIAMINE HYDROCHLORIDE 400 MG: 100 INJECTION, SOLUTION INTRAMUSCULAR; INTRAVENOUS at 05:17

## 2022-08-24 RX ADMIN — ALPRAZOLAM 1 MG: 1 TABLET ORAL at 05:16

## 2022-08-24 RX ADMIN — LOSARTAN POTASSIUM 25 MG: 25 TABLET, FILM COATED ORAL at 08:27

## 2022-08-24 RX ADMIN — ALPRAZOLAM 0.5 MG: 0.25 TABLET ORAL at 21:29

## 2022-08-24 RX ADMIN — Medication 5 MG: at 21:28

## 2022-08-24 RX ADMIN — THIAMINE HYDROCHLORIDE 400 MG: 100 INJECTION, SOLUTION INTRAMUSCULAR; INTRAVENOUS at 21:27

## 2022-08-24 RX ADMIN — THIAMINE HYDROCHLORIDE 400 MG: 100 INJECTION, SOLUTION INTRAMUSCULAR; INTRAVENOUS at 15:44

## 2022-08-24 RX ADMIN — DEXTRAN 70, GLYCERIN, HYPROMELLOSE 2 DROP: 1; 2; 3 SOLUTION/ DROPS OPHTHALMIC at 15:44

## 2022-08-24 RX ADMIN — DEXTRAN 70, GLYCERIN, HYPROMELLOSE 2 DROP: 1; 2; 3 SOLUTION/ DROPS OPHTHALMIC at 21:27

## 2022-08-24 RX ADMIN — CYANOCOBALAMIN 1000 MCG: 1000 INJECTION, SOLUTION INTRAMUSCULAR; SUBCUTANEOUS at 08:27

## 2022-08-24 RX ADMIN — PANTOPRAZOLE SODIUM 40 MG: 40 TABLET, DELAYED RELEASE ORAL at 08:27

## 2022-08-24 RX ADMIN — Medication 1000 UNITS: at 08:27

## 2022-08-24 RX ADMIN — DEXTRAN 70, GLYCERIN, HYPROMELLOSE 2 DROP: 1; 2; 3 SOLUTION/ DROPS OPHTHALMIC at 08:35

## 2022-08-24 RX ADMIN — ALPRAZOLAM 0.5 MG: 0.25 TABLET ORAL at 15:44

## 2022-08-24 RX ADMIN — PRAVASTATIN SODIUM 20 MG: 20 TABLET ORAL at 08:27

## 2022-08-24 RX ADMIN — LORAZEPAM 1 MG: 1 TABLET ORAL at 00:13

## 2022-08-25 VITALS
RESPIRATION RATE: 18 BRPM | SYSTOLIC BLOOD PRESSURE: 145 MMHG | HEART RATE: 100 BPM | TEMPERATURE: 98.1 F | HEIGHT: 64 IN | DIASTOLIC BLOOD PRESSURE: 90 MMHG | OXYGEN SATURATION: 99 % | BODY MASS INDEX: 25.95 KG/M2 | WEIGHT: 152 LBS

## 2022-08-25 LAB
ANION GAP SERPL CALC-SCNC: 7 MEQ/L (ref 3–15)
BUN SERPL-MCNC: 17 MG/DL (ref 8–20)
CALCIUM SERPL-MCNC: 9.1 MG/DL (ref 8.9–10.3)
CHLORIDE SERPL-SCNC: 101 MEQ/L (ref 98–109)
CO2 SERPL-SCNC: 28 MEQ/L (ref 22–32)
CREAT SERPL-MCNC: 0.7 MG/DL (ref 0.6–1.1)
ERYTHROCYTE [DISTWIDTH] IN BLOOD BY AUTOMATED COUNT: 12.5 % (ref 11.7–14.4)
GFR SERPL CREATININE-BSD FRML MDRD: >60 ML/MIN/1.73M*2
GLUCOSE SERPL-MCNC: 99 MG/DL (ref 70–99)
HCT VFR BLDCO AUTO: 39 % (ref 35–45)
HGB BLD-MCNC: 12.9 G/DL (ref 11.8–15.7)
MAGNESIUM SERPL-MCNC: 1.9 MG/DL (ref 1.8–2.5)
MCH RBC QN AUTO: 33.6 PG (ref 28–33.2)
MCHC RBC AUTO-ENTMCNC: 33.1 G/DL (ref 32.2–35.5)
MCV RBC AUTO: 101.6 FL (ref 83–98)
PDW BLD AUTO: 9.1 FL (ref 9.4–12.3)
PLATELET # BLD AUTO: 213 K/UL (ref 150–369)
POTASSIUM SERPL-SCNC: 3.9 MEQ/L (ref 3.6–5.1)
RBC # BLD AUTO: 3.84 M/UL (ref 3.93–5.22)
SODIUM SERPL-SCNC: 136 MEQ/L (ref 136–144)
WBC # BLD AUTO: 5.07 K/UL (ref 3.8–10.5)

## 2022-08-25 PROCEDURE — 36415 COLL VENOUS BLD VENIPUNCTURE: CPT | Performed by: HOSPITALIST

## 2022-08-25 PROCEDURE — 85027 COMPLETE CBC AUTOMATED: CPT | Performed by: HOSPITALIST

## 2022-08-25 PROCEDURE — 97166 OT EVAL MOD COMPLEX 45 MIN: CPT | Mod: GO

## 2022-08-25 PROCEDURE — 80048 BASIC METABOLIC PNL TOTAL CA: CPT | Performed by: HOSPITALIST

## 2022-08-25 PROCEDURE — 99239 HOSP IP/OBS DSCHRG MGMT >30: CPT | Performed by: HOSPITALIST

## 2022-08-25 PROCEDURE — 25000000 HC PHARMACY GENERAL: Performed by: HOSPITALIST

## 2022-08-25 PROCEDURE — 25800000 HC PHARMACY IV SOLUTIONS: Performed by: HOSPITALIST

## 2022-08-25 PROCEDURE — 63700000 HC SELF-ADMINISTRABLE DRUG: Performed by: HOSPITALIST

## 2022-08-25 PROCEDURE — 63600000 HC DRUGS/DETAIL CODE: Performed by: HOSPITALIST

## 2022-08-25 PROCEDURE — 83735 ASSAY OF MAGNESIUM: CPT | Performed by: HOSPITALIST

## 2022-08-25 PROCEDURE — 97535 SELF CARE MNGMENT TRAINING: CPT | Mod: GO

## 2022-08-25 RX ORDER — FOLIC ACID 1 MG/1
1 TABLET ORAL DAILY
Qty: 30 TABLET | Refills: 0
Start: 2022-08-26 | End: 2023-10-04 | Stop reason: ALTCHOICE

## 2022-08-25 RX ORDER — LANOLIN ALCOHOL/MO/W.PET/CERES
100 CREAM (GRAM) TOPICAL DAILY
Qty: 30 TABLET | Refills: 0
Start: 2022-08-25 | End: 2024-04-10 | Stop reason: ALTCHOICE

## 2022-08-25 RX ORDER — MELATONIN 5 MG
5 CAPSULE ORAL NIGHTLY
Qty: 30 CAPSULE | Refills: 0
Start: 2022-08-25 | End: 2023-06-21 | Stop reason: ALTCHOICE

## 2022-08-25 RX ORDER — LANOLIN ALCOHOL/MO/W.PET/CERES
1000 CREAM (GRAM) TOPICAL DAILY
Qty: 30 TABLET | Refills: 0
Start: 2022-08-26 | End: 2024-06-21

## 2022-08-25 RX ORDER — ALPRAZOLAM 1 MG/1
0.5 TABLET ORAL
Qty: 8 TABLET | Refills: 0
Start: 2022-08-25 | End: 2022-08-30

## 2022-08-25 RX ORDER — ARTIFICIAL TEARS 1; 2; 3 MG/ML; MG/ML; MG/ML
2 SOLUTION/ DROPS OPHTHALMIC 3 TIMES DAILY
Qty: 9 ML | Refills: 0
Start: 2022-08-25 | End: 2023-06-21 | Stop reason: ALTCHOICE

## 2022-08-25 RX ADMIN — PRAVASTATIN SODIUM 20 MG: 20 TABLET ORAL at 08:24

## 2022-08-25 RX ADMIN — ALPRAZOLAM 0.5 MG: 0.25 TABLET ORAL at 10:56

## 2022-08-25 RX ADMIN — LOSARTAN POTASSIUM 25 MG: 25 TABLET, FILM COATED ORAL at 08:24

## 2022-08-25 RX ADMIN — Medication 1000 UNITS: at 08:21

## 2022-08-25 RX ADMIN — THIAMINE HYDROCHLORIDE 400 MG: 100 INJECTION, SOLUTION INTRAMUSCULAR; INTRAVENOUS at 05:37

## 2022-08-25 RX ADMIN — DEXTRAN 70, GLYCERIN, HYPROMELLOSE 2 DROP: 1; 2; 3 SOLUTION/ DROPS OPHTHALMIC at 08:24

## 2022-08-25 RX ADMIN — DEXTRAN 70, GLYCERIN, HYPROMELLOSE 2 DROP: 1; 2; 3 SOLUTION/ DROPS OPHTHALMIC at 14:46

## 2022-08-25 RX ADMIN — THIAMINE HYDROCHLORIDE 400 MG: 100 INJECTION, SOLUTION INTRAMUSCULAR; INTRAVENOUS at 14:45

## 2022-08-25 RX ADMIN — FOLIC ACID 1 MG: 5 INJECTION, SOLUTION INTRAMUSCULAR; INTRAVENOUS; SUBCUTANEOUS at 08:25

## 2022-08-25 RX ADMIN — CYANOCOBALAMIN 1000 MCG: 1000 INJECTION, SOLUTION INTRAMUSCULAR; SUBCUTANEOUS at 08:25

## 2022-08-25 RX ADMIN — PANTOPRAZOLE SODIUM 40 MG: 40 TABLET, DELAYED RELEASE ORAL at 08:23

## 2022-08-26 LAB
1,25(OH)2D SERPL-MCNC: 36 PG/ML (ref 18–72)
1,25(OH)2D2 SERPL-MCNC: <8 PG/ML
1,25(OH)2D3 SERPL-MCNC: 36 PG/ML

## 2022-08-28 LAB
BACTERIA BLD CULT: NORMAL
BACTERIA BLD CULT: NORMAL

## 2023-01-04 ENCOUNTER — OFFICE VISIT (OUTPATIENT)
Dept: CARDIOLOGY | Facility: CLINIC | Age: 73
End: 2023-01-04
Payer: COMMERCIAL

## 2023-01-04 VITALS
BODY MASS INDEX: 24.24 KG/M2 | HEART RATE: 91 BPM | SYSTOLIC BLOOD PRESSURE: 139 MMHG | WEIGHT: 142 LBS | DIASTOLIC BLOOD PRESSURE: 85 MMHG | HEIGHT: 64 IN | OXYGEN SATURATION: 98 %

## 2023-01-04 DIAGNOSIS — E78.49 OTHER HYPERLIPIDEMIA: Primary | ICD-10-CM

## 2023-01-04 DIAGNOSIS — I49.3 PVC'S (PREMATURE VENTRICULAR CONTRACTIONS): ICD-10-CM

## 2023-01-04 DIAGNOSIS — I10 PRIMARY HYPERTENSION: ICD-10-CM

## 2023-01-04 DIAGNOSIS — R06.02 SHORTNESS OF BREATH: ICD-10-CM

## 2023-01-04 DIAGNOSIS — F10.20 ALCOHOLISM (CMS/HCC): ICD-10-CM

## 2023-01-04 PROCEDURE — 99214 OFFICE O/P EST MOD 30 MIN: CPT | Performed by: INTERNAL MEDICINE

## 2023-01-04 PROCEDURE — 3008F BODY MASS INDEX DOCD: CPT | Performed by: INTERNAL MEDICINE

## 2023-01-04 PROCEDURE — 93000 ELECTROCARDIOGRAM COMPLETE: CPT | Performed by: INTERNAL MEDICINE

## 2023-01-04 RX ORDER — ATORVASTATIN CALCIUM 10 MG/1
10 TABLET, FILM COATED ORAL DAILY
Qty: 90 TABLET | Refills: 1 | Status: SHIPPED | OUTPATIENT
Start: 2023-01-04 | End: 2023-06-21 | Stop reason: SDUPTHER

## 2023-01-04 ASSESSMENT — ENCOUNTER SYMPTOMS
NEAR-SYNCOPE: 0
SHORTNESS OF BREATH: 0
IRREGULAR HEARTBEAT: 0
DIZZINESS: 0
PALPITATIONS: 0
SYNCOPE: 0
LIGHT-HEADEDNESS: 0
DYSPNEA ON EXERTION: 1

## 2023-01-04 NOTE — PROGRESS NOTES
Cardiology  Office Progress Note         Reason for visit:   Chief Complaint   Patient presents with   • Follow-up       HPI     Rena Ramirez is a 72 y.o. female who presents to the office for cardiovascular follow up and management of other HLD, shortness of breath, and family history of CAD.     She was last seen in our office on 22 for an initial evaluation. I discussed with her about switching to Atorvastatin and she agrees, based on the LDL level will decide what dose. I planned for her to have an echo. I did not make any changes to her medical regimen. She was to follow up in 6 months.     -, she was in  for a fall.     Today, her  has accompanied her to the visit. She reports shortness of breath with going up 16 steps. She notes LLE swelling from previous injury/surgery to that leg. She denies any recent falls since her most recent hospitalization. She reports she is still taking the Simvastatin. She denies any chest pain, palpitations, dizziness, lightheadedness, near syncope or syncopal episodes.     Past Medical History:   Diagnosis Date   • Alcoholism (CMS/[facility])    • Anxiety    • Hypertension    • Lipid disorder        Past Surgical History:   Procedure Laterality Date   • REPLACEMENT TOTAL HIP LATERAL POSITION Right    • TOTAL SHOULDER REPLACEMENT Right        Social History     Tobacco Use   • Smoking status: Former     Packs/day: 1.00     Years: 13.00     Pack years: 13.00     Types: Cigarettes     Quit date:      Years since quittin.0   • Smokeless tobacco: Never   • Tobacco comments:     smoked socially   Vaping Use   • Vaping Use: Never used   Substance Use Topics   • Alcohol use: Yes     Alcohol/week: 21.0 standard drinks     Types: 21 Glasses of wine per week     Comment: Drinks 2-3 glasses of white wine daily since she was in her early 50s   • Drug use: Never       Family History   Problem Relation Age of Onset   • Heart disease Biological Father    • Heart  attack Biological Sister    • Heart disease Biological Sister    • Other Biological Sister         PPM   • Heart disease Biological Brother        Allergies:  Morphine and Sulfa (sulfonamide antibiotics)    Current Outpatient Medications   Medication Sig Dispense Refill   • artificial tears, dextran-hpm-glyc, (GENTEAL TEARS) 0.1-0.3-0.2 % drops eye drops Administer 2 drops into both eyes 3 (three) times a day. 9 mL 0   • atorvastatin (LIPITOR) 10 mg tablet Take 1 tablet (10 mg total) by mouth daily. 90 tablet 1   • cholecalciferol, vitamin D3, 1,000 unit (25 mcg) tablet Take 1,000 Units by mouth daily.     • cyanocobalamin (VITAMIN B12) 1,000 mcg tablet Take 1 tablet (1,000 mcg total) by mouth daily. 30 tablet 0   • folic acid (FOLVITE) 1 mg tablet Take 1 tablet (1 mg total) by mouth daily. 30 tablet 0   • losartan (COZAAR) 25 mg tablet Take 25 mg by mouth daily.     • melatonin 5 mg capsule Take 1 capsule (5 mg total) by mouth nightly. 30 capsule 0   • pantoprazole (PROTONIX) 20 mg EC tablet Take 20 mg by mouth daily.     • thiamine 100 mg tablet Take 1 tablet (100 mg total) by mouth daily. 30 tablet 0   • coenzyme Q10 (COQ10) 100 mg capsule Take 100 mg by mouth daily.       No current facility-administered medications for this visit.       Review of Systems   Cardiovascular: Positive for dyspnea on exertion. Negative for chest pain, irregular heartbeat, leg swelling, near-syncope, palpitations and syncope.   Respiratory: Negative for shortness of breath.    Neurological: Negative for dizziness and light-headedness.       Objective     Vitals:    01/04/23 1423   BP: 139/85   Pulse: 91   SpO2: 98%       Wt Readings from Last 3 Encounters:   01/04/23 64.4 kg (142 lb)   08/23/22 68.9 kg (152 lb)   07/11/22 62.1 kg (137 lb)       Body mass index is 24.37 kg/m².  Body surface area is 1.71 meters squared.    Physical Exam  Constitutional:       Appearance: Normal appearance.   Neck:      Vascular: No carotid bruit or  JVD.   Cardiovascular:      Rate and Rhythm: Normal rate and regular rhythm.      Pulses: Normal pulses.      Heart sounds: Normal heart sounds. No murmur heard.  Pulmonary:      Effort: Pulmonary effort is normal. No respiratory distress.      Breath sounds: Normal breath sounds.   Musculoskeletal:         General: No swelling.      Right lower leg: No edema.      Left lower leg: Edema present.   Skin:     General: Skin is warm and dry.   Neurological:      General: No focal deficit present.      Mental Status: She is alert and oriented to person, place, and time.         ECG   Sinus  Rhythm  - 2 PVCs  WITHIN NORMAL LIMITS    Cardiac Imaging    TRANSTHORACIC ECHO (TTE) COMPLETE 07/11/2022    Interpretation Summary  · Normal-sized LV. Normal LV systolic function. Estimated EF 60- 65%. Wall motion appears grossly normal. Normal LV wall thickness. Normal diastolic filling pattern for age of the LV.  · Normal-sized LA.  · No evidence of pericardial effusion.  · Tricuspid valve structure is grossly normal. Mild to moderate tricuspid valve regurgitation.  · Estimated RVSP = 32 mmHg (assuming a right atrial pressure of 3 mmHg).  · Grossly normal pulmonic valve structure. Trace pulmonic valve regurgitation. No pulmonic valve stenosis.  · Tricuspid aortic valve. No aortic valve regurgitation. No aortic valve stenosis.  · Normal-sized RV. Normal RV systolic function.  · Normal-sized RA.  · Grossly normal leaflet structure of the mitral valve.  · No significant mitral valve regurgitation.  · No significant mitral valve stenosis.  · IVC not well visualized.  · No previous echocardiogram available for comparison.      Hematology  Lab Results   Component Value Date    WBC 5.07 08/25/2022    HGB 12.9 08/25/2022    HCT 39.0 08/25/2022     08/25/2022    INR 1.1 08/23/2022       Chemistries  Lab Results   Component Value Date     08/25/2022    K 3.9 08/25/2022     08/25/2022    CREATININE 0.7 08/25/2022    BUN 17  08/25/2022    CO2 28 08/25/2022    GLUCOSE 99 08/25/2022    CALCIUM 9.1 08/25/2022    MG 1.9 08/25/2022    ALT 19 08/23/2022    AST 29 08/23/2022       Cholesterol  No results found for: CHOL, TRIG, HDL, LDLCALC, NONHDLCALC    Endocrine  Lab Results   Component Value Date    TSH 1.23 08/23/2022           Assessment/Plan     Other hyperlipidemia  On 6/29/2022 her LDL was 116.  I discussed this finding with patient.  We decided to switch to simvastatin to atorvastatin 10 mg daily.  A pamphlet from the National Lipid Association with lifestyle tips to decrease the LDL cholesterol was given to patient.  She will follow with her primary care physician for repeat cholesterol levels.    Shortness of breath  Her shortness of breath is not changed from prior.  There are no clinical findings consistent with decompensated heart failure at this time.  Continue efforts for blood pressure control.  Previous echocardiogram with preserved low ventricular systolic function and no major valvular abnormalities.  Patient is likely deconditioned, I advised her to increase her activity level and exercise as tolerated.    Primary hypertension  Blood pressure is borderline elevated today.  I discussed this finding with patient.  We decided not to make any medication changes at this time.  Continue losartan.  I advised her to minimize the use of alcohol.  We discussed about the proper way of checking the blood pressure and a video from the [employer] Association was shown to patient.  Patient will monitor the blood pressure and let me know in 2 weeks.    Alcoholism (CMS/[facility])  I discussed with patient and her  the importance of minimizing the use of alcohol to decrease the risk of cardiovascular events.    PVC's (premature ventricular contractions)  Evidence of PVCs on the ECG and rare extrasystoles at physical examination.  I discussed this finding with patient.  I advised her to minimize the use of alcohol.  We decided to  monitor clinically for now.        Orders Placed This Encounter   Procedures   • ECG 12 lead     Scheduling Instructions:      PLEASE USE THIS ORDER FOR ECG'S PERFORMED IN PHYSICIAN OFFICES     Order Specific Question:   Release to patient     Answer:   Immediate            This note was completed in part utilizing a voice recognition software. Grammatical errors, random word insertion, spelling mistakes, and incomplete sentences may be an occasional consequence of the system secondary to software limitations, ambient noise and hardware issues.   Please read the chart carefully and recognize, using context, where substitutions have occurred. If you have any questions or concerns about the context, text or information contained within the body of this dictation, please contact myself, the provider, for further clarification.     IMeek, am scribing for, and in the presence of, Kana Karimi MD.      IKana MD, personally performed the services described in this documentation as scribed by Meek Mora in my presence, and it is both accurate and complete.       Kana Karimi MD  1/4/2023

## 2023-01-04 NOTE — ASSESSMENT & PLAN NOTE
I discussed with patient and her  the importance of minimizing the use of alcohol to decrease the risk of cardiovascular events.

## 2023-01-04 NOTE — ASSESSMENT & PLAN NOTE
Her shortness of breath is not changed from prior.  There are no clinical findings consistent with decompensated heart failure at this time.  Continue efforts for blood pressure control.  Previous echocardiogram with preserved low ventricular systolic function and no major valvular abnormalities.  Patient is likely deconditioned, I advised her to increase her activity level and exercise as tolerated.

## 2023-01-04 NOTE — ASSESSMENT & PLAN NOTE
Blood pressure is borderline elevated today.  I discussed this finding with patient.  We decided not to make any medication changes at this time.  Continue losartan.  I advised her to minimize the use of alcohol.  We discussed about the proper way of checking the blood pressure and a video from the American [employer] was shown to patient.  Patient will monitor the blood pressure and let me know in 2 weeks.

## 2023-01-04 NOTE — ASSESSMENT & PLAN NOTE
Evidence of PVCs on the ECG and rare extrasystoles at physical examination.  I discussed this finding with patient.  I advised her to minimize the use of alcohol.  We decided to monitor clinically for now.

## 2023-01-04 NOTE — ASSESSMENT & PLAN NOTE
On 6/29/2022 her LDL was 116.  I discussed this finding with patient.  We decided to switch to simvastatin to atorvastatin 10 mg daily.  A pamphlet from the [employer] Association with lifestyle tips to decrease the LDL cholesterol was given to patient.  She will follow with her primary care physician for repeat cholesterol levels.

## 2023-02-21 ENCOUNTER — HOSPITAL ENCOUNTER (EMERGENCY)
Facility: HOSPITAL | Age: 73
Discharge: LEFT BEFORE DISCHARGE | End: 2023-02-21
Attending: EMERGENCY MEDICINE
Payer: COMMERCIAL

## 2023-02-21 ENCOUNTER — APPOINTMENT (EMERGENCY)
Dept: RADIOLOGY | Facility: HOSPITAL | Age: 73
End: 2023-02-21
Payer: COMMERCIAL

## 2023-02-21 VITALS
SYSTOLIC BLOOD PRESSURE: 159 MMHG | OXYGEN SATURATION: 96 % | HEART RATE: 82 BPM | DIASTOLIC BLOOD PRESSURE: 90 MMHG | RESPIRATION RATE: 12 BRPM | TEMPERATURE: 98.5 F

## 2023-02-21 DIAGNOSIS — S22.42XA CLOSED FRACTURE OF MULTIPLE RIBS OF LEFT SIDE, INITIAL ENCOUNTER: Primary | ICD-10-CM

## 2023-02-21 LAB
ANION GAP SERPL CALC-SCNC: 8 MEQ/L (ref 3–15)
APTT PPP: 24 SEC (ref 23–35)
BASOPHILS # BLD: 0.03 K/UL (ref 0.01–0.1)
BASOPHILS NFR BLD: 0.5 %
BUN SERPL-MCNC: 25 MG/DL (ref 8–20)
CALCIUM SERPL-MCNC: 9.6 MG/DL (ref 8.9–10.3)
CHLORIDE SERPL-SCNC: 103 MEQ/L (ref 98–109)
CO2 SERPL-SCNC: 25 MEQ/L (ref 22–32)
CREAT SERPL-MCNC: 0.8 MG/DL (ref 0.6–1.1)
DIFFERENTIAL METHOD BLD: ABNORMAL
EOSINOPHIL # BLD: 0.04 K/UL (ref 0.04–0.36)
EOSINOPHIL NFR BLD: 0.6 %
ERYTHROCYTE [DISTWIDTH] IN BLOOD BY AUTOMATED COUNT: 13.5 % (ref 11.7–14.4)
GFR SERPL CREATININE-BSD FRML MDRD: >60 ML/MIN/1.73M*2
GLUCOSE SERPL-MCNC: 108 MG/DL (ref 70–99)
HCT VFR BLDCO AUTO: 41.7 % (ref 35–45)
HGB BLD-MCNC: 13.8 G/DL (ref 11.8–15.7)
IMM GRANULOCYTES # BLD AUTO: 0.02 K/UL (ref 0–0.08)
IMM GRANULOCYTES NFR BLD AUTO: 0.3 %
LYMPHOCYTES # BLD: 1.05 K/UL (ref 1.2–3.5)
LYMPHOCYTES NFR BLD: 15.8 %
MCH RBC QN AUTO: 33.7 PG (ref 28–33.2)
MCHC RBC AUTO-ENTMCNC: 33.1 G/DL (ref 32.2–35.5)
MCV RBC AUTO: 102 FL (ref 83–98)
MONOCYTES # BLD: 0.66 K/UL (ref 0.28–0.8)
MONOCYTES NFR BLD: 9.9 %
NEUTROPHILS # BLD: 4.86 K/UL (ref 1.7–7)
NEUTS SEG NFR BLD: 72.9 %
NRBC BLD-RTO: 0 %
PDW BLD AUTO: 9 FL (ref 9.4–12.3)
PLATELET # BLD AUTO: 239 K/UL (ref 150–369)
POTASSIUM SERPL-SCNC: 4.3 MEQ/L (ref 3.6–5.1)
RBC # BLD AUTO: 4.09 M/UL (ref 3.93–5.22)
SODIUM SERPL-SCNC: 136 MEQ/L (ref 136–144)
WBC # BLD AUTO: 6.66 K/UL (ref 3.8–10.5)

## 2023-02-21 PROCEDURE — 3E0233Z INTRODUCTION OF ANTI-INFLAMMATORY INTO MUSCLE, PERCUTANEOUS APPROACH: ICD-10-PCS | Performed by: EMERGENCY MEDICINE

## 2023-02-21 PROCEDURE — 63600105 HC IODINE BASED CONTRAST: Performed by: PHYSICIAN ASSISTANT

## 2023-02-21 PROCEDURE — 36415 COLL VENOUS BLD VENIPUNCTURE: CPT | Performed by: PHYSICIAN ASSISTANT

## 2023-02-21 PROCEDURE — 73030 X-RAY EXAM OF SHOULDER: CPT | Mod: RT

## 2023-02-21 PROCEDURE — 85730 THROMBOPLASTIN TIME PARTIAL: CPT | Performed by: PHYSICIAN ASSISTANT

## 2023-02-21 PROCEDURE — 71101 X-RAY EXAM UNILAT RIBS/CHEST: CPT | Mod: RT

## 2023-02-21 PROCEDURE — 71260 CT THORAX DX C+: CPT | Mod: ME

## 2023-02-21 PROCEDURE — 80048 BASIC METABOLIC PNL TOTAL CA: CPT | Performed by: PHYSICIAN ASSISTANT

## 2023-02-21 PROCEDURE — 63600000 HC DRUGS/DETAIL CODE: Performed by: PHYSICIAN ASSISTANT

## 2023-02-21 PROCEDURE — 96372 THER/PROPH/DIAG INJ SC/IM: CPT | Mod: 59

## 2023-02-21 PROCEDURE — 99284 EMERGENCY DEPT VISIT MOD MDM: CPT | Mod: 25

## 2023-02-21 PROCEDURE — 72100 X-RAY EXAM L-S SPINE 2/3 VWS: CPT

## 2023-02-21 PROCEDURE — 85025 COMPLETE CBC W/AUTO DIFF WBC: CPT | Performed by: PHYSICIAN ASSISTANT

## 2023-02-21 RX ORDER — KETOROLAC TROMETHAMINE 30 MG/ML
30 INJECTION, SOLUTION INTRAMUSCULAR; INTRAVENOUS ONCE
Status: COMPLETED | OUTPATIENT
Start: 2023-02-21 | End: 2023-02-21

## 2023-02-21 RX ADMIN — KETOROLAC TROMETHAMINE 30 MG: 30 INJECTION, SOLUTION INTRAMUSCULAR at 12:00

## 2023-02-21 RX ADMIN — IOHEXOL 100 ML: 350 INJECTION, SOLUTION INTRAVENOUS at 14:11

## 2023-02-21 NOTE — ED PROVIDER NOTES
Emergency Medicine Note  HPI   HISTORY OF PRESENT ILLNESS     72-year-old female history of alcoholism, anxiety, hypertension, lipid disorder came into the ED for evaluation of right shoulder pain, and right lateral rib pain.  Patient told me 4 days ago she had a mechanical fall falling down 3 steps.  Told me that the pain has become worsening and is worse with inspiration so she became concerned and decided to come to the ED for evaluation.  Denies hitting her head or any loss of consciousness.  Denies any headache or neck stiffness.  Told me that she is not on oral anticoagulant.  Denies any abdominal pain.  She appears to be somewhat mild to moderate distress due to pain otherwise was not ill or toxic in appearance.            Patient History   PAST HISTORY     Reviewed from Nursing Triage:       Past Medical History:   Diagnosis Date   • Alcoholism (CMS/HCC)    • Anxiety    • Hypertension    • Lipid disorder        Past Surgical History:   Procedure Laterality Date   • REPLACEMENT TOTAL HIP LATERAL POSITION Right    • TOTAL SHOULDER REPLACEMENT Right        Family History   Problem Relation Age of Onset   • Heart disease Biological Father    • Heart attack Biological Sister    • Heart disease Biological Sister    • Other Biological Sister         PPM   • Heart disease Biological Brother        Social History     Tobacco Use   • Smoking status: Former     Packs/day: 1.00     Years: 13.00     Pack years: 13.00     Types: Cigarettes     Quit date:      Years since quittin.1   • Smokeless tobacco: Never   • Tobacco comments:     smoked socially   Vaping Use   • Vaping Use: Never used   Substance Use Topics   • Alcohol use: Yes     Alcohol/week: 21.0 standard drinks     Types: 21 Glasses of wine per week     Comment: Drinks 2-3 glasses of white wine daily since she was in her early 50s   • Drug use: Never         Review of Systems   REVIEW OF SYSTEMS     Review of Systems      VITALS     ED Vitals     Date/Time Temp Pulse Resp BP SpO2 [facility]   02/21/23 0956 36.9 °C (98.5 °F) 95 18 143/85 96 % ML                       Physical Exam   PHYSICAL EXAM     Physical Exam  Vitals and nursing note reviewed.   Constitutional:       General: She is not in acute distress.     Appearance: She is well-developed. She is not ill-appearing, toxic-appearing or diaphoretic.   HENT:      Head: Normocephalic and atraumatic.      Nose: No congestion or rhinorrhea.      Mouth/Throat:      Mouth: Mucous membranes are moist.   Eyes:      Extraocular Movements: Extraocular movements intact.      Conjunctiva/sclera: Conjunctivae normal.      Pupils: Pupils are equal, round, and reactive to light.   Neck:      Thyroid: No thyroid mass or thyroid tenderness.      Vascular: Normal carotid pulses. No carotid bruit.   Cardiovascular:      Rate and Rhythm: Normal rate and regular rhythm.      Heart sounds: Normal heart sounds.   Pulmonary:      Effort: Pulmonary effort is normal. No respiratory distress.      Breath sounds: Normal breath sounds.   Chest:      Chest wall: Tenderness present. No mass, lacerations, deformity, swelling, crepitus or edema. There is no dullness to percussion.   Breasts:     Breasts are symmetrical.      Right: Normal.          Comments: Patient has tenderness to palpation to the lateral aspect of her right chest wall.  No flail chest could be noted, no crepitus, or subcutaneous air noted on my examination.  Clear breath sounds on lung auscultation.   Abdominal:      General: There is no distension. There are no signs of injury.      Palpations: Abdomen is soft. There is no mass.      Tenderness: There is no abdominal tenderness. There is no right CVA tenderness or left CVA tenderness. Negative signs include McBurney's sign.   Musculoskeletal:      Right shoulder: Tenderness present. No swelling, deformity, effusion, laceration, bony tenderness or crepitus. Normal range of motion. Normal strength. Normal pulse.       Left shoulder: Normal.      Right upper arm: Normal.      Left upper arm: Normal.      Cervical back: Normal and full passive range of motion without pain. No rigidity or crepitus. No pain with movement, spinous process tenderness or muscular tenderness.      Thoracic back: Normal.      Lumbar back: Normal.      Right lower leg: No edema.      Left lower leg: No edema.   Skin:     General: Skin is warm and dry.      Capillary Refill: Capillary refill takes less than 2 seconds.   Neurological:      Mental Status: She is alert and oriented to person, place, and time.           PROCEDURES     Procedures     DATA     Results     Procedure Component Value Units Date/Time    CBC and differential [[id number]] Collected: 02/21/23 1225    Specimen: Blood, Venous Updated: 02/21/23 1230    APTT [[id number]] Collected: 02/21/23 1225    Specimen: Blood, Venous Updated: 02/21/23 1230    Basic metabolic panel [[id number]] Collected: 02/21/23 1225    Specimen: Blood, Venous Updated: 02/21/23 1230          Imaging Results          X-RAY SHOULDER RIGHT 2+ VIEWS (Final result)  Result time 02/21/23 11:43:14    Final result                 Impression:    IMPRESSION:  No acute osseous abnormality in the right shoulder. See separate report  regarding rib fractures.             Narrative:    CLINICAL HISTORY: fall landed on R side    COMMENT:  Technique: Grashey, internal rotation, external rotation, and scapular Y views  of the right shoulder.  Comparison: 10/17/2011.    Redemonstrated right shoulder prosthesis, noting that the prosthesis obscures  portions of the glenoid.    No periprosthetic fracture identified. No shoulder dislocation. Marked  degenerative changes at the mid clavicular joint.                               X-RAY RIBS RIGHT WITH PA CHEST (Final result)  Result time 02/21/23 11:42:44    Final result                 Impression:    IMPRESSION:  Mildly displaced fractures of the right fourth through sixth ribs.  No  pneumothorax.             Narrative:    CLINICAL HISTORY: Fall. Landed on right side.    COMMENT:  Technique: Frontal view of the chest. Frontal and bilateral oblique views of the  right ribs.  Comparison: Chest radiograph 8/24/2022.    Clear lungs. No pneumothorax. There is mild tortuosity of the thoracic aorta.    There are mildly displaced fractures of the lateral right fourth and fifth ribs  and posterolateral right sixth rib.                               X-RAY LUMBAR SPINE 2 OR 3 VIEWS (Final result)  Result time 02/21/23 11:53:22    Final result                 Impression:    IMPRESSION:Possible compression fracture deformity of the superior endplate of  L1, the age of which is indeterminate.    This could also be artifactual in  nature related to a dextroscoliosis which is centered at the T12-L1 level.             Narrative:    CLINICAL HISTORY: Back pain status post fall    COMMENT:AP and lateral radiograph the lumbar spine were obtained 2/21/2023.  There are no previous relevant studies for comparison.    The bones are somewhat osteopenic.  There is a dextroscoliosis centered at  approximately T12-L1.  There is a possible compression fracture deformity  involving the superior endplate of L1 with the age of which is uncertain.  This  could also be artifactual in nature given the presence of scoliosis at this  level.  Clinical correlation is advised.  If clinically warranted, follow-up  outpatient MRI may be useful for further evaluation.  The lumbar vertebra  otherwise normal in height.  There is a grade 1 anterolisthesis of L4 on L5.  Lumbar vertebra otherwise normal in alignment.  There is degenerative disc space  narrowing and vacuum disc phenomena at L4-L5 and L5-S1.  The pedicles appear  intact throughout the lumbar spine.                                No orders to display       Scoring tools                                  ED Course & MDM   MDM / ED COURSE / CLINICAL IMPRESSION / DISPO      [facility]    ED Course as of 02/21/23 1609   Tue Feb 21, 2023   1207 X-ray shows Mildly displaced fractures of the right fourth through sixth ribs. No  pneumothorax. [FS]   1207 X-rays of patient lumbar spine shows There is a possible compression fracture deformity  involving the superior endplate of L1 with the age of which is uncertain.  This  could also be artifactual in nature given the presence of scoliosis at this  level.  Clinical correlation is advised.  If clinically warranted, follow-up  outpatient MRI may be useful for further evaluation.  The lumbar vertebra  otherwise normal in height. [FS]   1208 Patient was given Toradol for pain. [FS]   1208 Paging trauma [FS]   1213 Spoke to trauma attending Dr. lin who recommended to obtain a CT of patient chest, pain medication, and called back once imaging come back. [FS]   1520 I will start by patient in the hallway, stating that she would like to be discharged and go home.  She told me that her pain have completely subsided after the Toradol.  Patient was told that we are still waiting on the CT scan of her chest abdomen and pelvis before discharge. [FS]   1544 Pt signing out AMA. Does not want to wait for CT reports. Will not stay to be admitted. AMA paperwork signed. [MR]   1548 I was called at bedside by patient nurse Tasneem who stated patient would like to be discharged.  She states that she has been waiting in the ED since 9 AM and would like to go home.  Patient at this time was told that we can discharge her without results from CT of her chest.  She was told that we can safely stated she does not have more rib fracture, we cannot completely rule out pneumothorax, or any intra-abdominal injury.  She is was told if she would like to be discharged at this time she would have to sign an AMA form.  She stated that she would hang tight for a little while but if the result does not come back she may have to sign AMA out the department. [FS]   1609 Patient  signed AMA [FS]      ED Course User Index  [FS] Ron Loyd PA C  [MR] Oswaldo Vizcaino, DO     Clinical Impression      None               Ron Loyd PA C  02/22/23 5680

## 2023-02-21 NOTE — DISCHARGE INSTRUCTIONS
This patient is choosing to leave against medical advice.  The emergency provider has personally explained to the patient that choosing to do so may result in permanent bodily harm or death.  The emergency provider discussed at great length that without further evaluation and monitoring there may be unforeseen circumstances and/or deterioration causing permanent bodily harm or death as a result of the patient's choice.  Rena Ramirez verbalized these risks back to the physician in layman’s terms.  The patient is alert, oriented, and shows the mental capacity to make clear decisions regarding health care at this time. Rena Ramirez continues to wish to leave against medical advice.    In light of this decision to leave AMA, follow-up has been advised and the patient is aware of the importance of following up as instructed.  Rena Ramirez has been advised of the option to return to the ED at any time or with any problems, concerns, or worsening condition. Discharge instructions were provided to the patient.

## 2023-02-21 NOTE — ED NOTES
Pt left AMA.  ED provider spoke with pt and educated her on the risks of leaving.  Pt states she cannot stay for imaging results as she had to go home and make her  dinner.  Pt verbalized understanding of leaving AMA.     Aline Phillips, RN  02/21/23 1929

## 2023-02-27 NOTE — ED ATTESTATION NOTE
I have personally seen and examined Rnea Ramirez.  I personally performed the key components of the encounter and provided a substantive portion of the care and medical decision making for this patient.    I reviewed and agree with physician assistant / nurse practitioner’s assessment and plan of care, with any exceptions as documented below.      My focused history, examination, assessment, and plan of care of Rena Ramirez is as follows:    Brief History:    Trauma  Mechanism of injury: fall   Shoulder Injury    72-year-old female presents emergency room for evaluation of a fall that occurred several days ago.  Patient landed on her right side and now having pain along the right shoulder and right back and right ribs.      Focused Physical Exam:  Physical Exam  Constitutional:       General: She is in acute distress.      Appearance: She is not toxic-appearing.   Pulmonary:      Effort: No respiratory distress.      Breath sounds: No stridor.   Chest:      Chest wall: Tenderness present.   Musculoskeletal:         General: Normal range of motion.   Neurological:      Mental Status: She is alert and oriented to person, place, and time.             Assessment / Plan / MDM:  MDM    Rib fracture  AMA (pt signed out AMA prior to test results)    I was physically present for the key/critical portions of the following procedures:  Procedures           Oswaldo Vizcaino, DO  02/27/23 1150

## 2023-05-19 ENCOUNTER — APPOINTMENT (OUTPATIENT)
Dept: URBAN - METROPOLITAN AREA CLINIC 203 | Age: 73
Setting detail: DERMATOLOGY
End: 2023-05-22

## 2023-05-19 DIAGNOSIS — L71.0 PERIORAL DERMATITIS: ICD-10-CM

## 2023-05-19 PROCEDURE — 99204 OFFICE O/P NEW MOD 45 MIN: CPT

## 2023-05-19 PROCEDURE — OTHER COUNSELING: OTHER

## 2023-05-19 PROCEDURE — OTHER PRESCRIPTION: OTHER

## 2023-05-19 PROCEDURE — OTHER PRESCRIPTION MEDICATION MANAGEMENT: OTHER

## 2023-05-19 RX ORDER — DOXYCYCLINE HYCLATE 100 MG/1
TABLET, COATED ORAL BID
Qty: 84 | Refills: 0 | Status: ERX | COMMUNITY
Start: 2023-05-19

## 2023-05-19 RX ORDER — METRONIDAZOLE 7.5 MG/G
GEL TOPICAL BID
Qty: 45 | Refills: 3 | Status: ERX | COMMUNITY
Start: 2023-05-19

## 2023-05-19 ASSESSMENT — LOCATION DETAILED DESCRIPTION DERM
LOCATION DETAILED: LEFT INFERIOR MEDIAL MALAR CHEEK
LOCATION DETAILED: RIGHT INFERIOR MEDIAL MALAR CHEEK
LOCATION DETAILED: RIGHT MEDIAL MALAR CHEEK

## 2023-05-19 ASSESSMENT — LOCATION SIMPLE DESCRIPTION DERM
LOCATION SIMPLE: LEFT CHEEK
LOCATION SIMPLE: RIGHT CHEEK

## 2023-05-19 ASSESSMENT — LOCATION ZONE DERM: LOCATION ZONE: FACE

## 2023-05-19 NOTE — PROCEDURE: PRESCRIPTION MEDICATION MANAGEMENT
Initiate Treatment: Metronidazole.75% bid and doxycycline 100mg bid x6 weeks
Render In Strict Bullet Format?: No
Detail Level: Zone

## 2023-05-20 RX ORDER — DOXYCYCLINE HYCLATE 100 MG/1
TABLET, COATED ORAL BID
Qty: 84 | Refills: 0 | Status: ERX

## 2023-05-20 RX ORDER — METRONIDAZOLE 7.5 MG/G
GEL TOPICAL BID
Qty: 45 | Refills: 3 | Status: ERX

## 2023-05-21 ENCOUNTER — APPOINTMENT (OUTPATIENT)
Dept: URBAN - METROPOLITAN AREA CLINIC 200 | Age: 73
Setting detail: DERMATOLOGY
End: 2023-05-22

## 2023-05-21 DIAGNOSIS — L71.8 OTHER ROSACEA: ICD-10-CM

## 2023-05-21 PROCEDURE — OTHER PRESCRIPTION: OTHER

## 2023-05-21 RX ORDER — DOXYCYCLINE HYCLATE 20 MG/1
TABLET, FILM COATED ORAL
Qty: 84 | Refills: 0 | COMMUNITY
Start: 2023-05-21

## 2023-05-21 RX ORDER — METRONIDAZOLE 7.5 MG/G
GEL TOPICAL
Qty: 45 | Refills: 3

## 2023-05-21 ASSESSMENT — LOCATION SIMPLE DESCRIPTION DERM
LOCATION SIMPLE: LEFT CHEEK
LOCATION SIMPLE: RIGHT CHEEK

## 2023-05-21 ASSESSMENT — LOCATION ZONE DERM: LOCATION ZONE: FACE

## 2023-05-21 ASSESSMENT — LOCATION DETAILED DESCRIPTION DERM
LOCATION DETAILED: RIGHT INFERIOR CENTRAL MALAR CHEEK
LOCATION DETAILED: LEFT INFERIOR MEDIAL MALAR CHEEK

## 2023-05-22 ENCOUNTER — APPOINTMENT (OUTPATIENT)
Dept: URBAN - METROPOLITAN AREA CLINIC 200 | Age: 73
Setting detail: DERMATOLOGY
End: 2023-05-29

## 2023-05-22 DIAGNOSIS — L71.8 OTHER ROSACEA: ICD-10-CM

## 2023-05-22 PROCEDURE — OTHER PRESCRIPTION: OTHER

## 2023-05-22 RX ORDER — DOXYCYCLINE HYCLATE 100 MG/1
CAPSULE, GELATIN COATED ORAL
Qty: 84 | Refills: 0 | COMMUNITY
Start: 2023-05-22

## 2023-05-22 RX ORDER — METRONIDAZOLE 10 MG/G
GEL TOPICAL QD
Qty: 60 | Refills: 5 | COMMUNITY
Start: 2023-05-22

## 2023-05-22 ASSESSMENT — LOCATION ZONE DERM: LOCATION ZONE: FACE

## 2023-05-22 ASSESSMENT — LOCATION DETAILED DESCRIPTION DERM: LOCATION DETAILED: RIGHT INFERIOR CENTRAL MALAR CHEEK

## 2023-05-22 ASSESSMENT — LOCATION SIMPLE DESCRIPTION DERM: LOCATION SIMPLE: RIGHT CHEEK

## 2023-06-08 NOTE — PROGRESS NOTES
Cardiology  Office Progress Note         Reason for visit:   Chief Complaint   Patient presents with   • Follow-up       HPI     Rena Ramirez is a 73 y.o. female who presents to the office for cardiovascular follow up and management of other HLD, shortness of breath, HTN, alcoholism, and PVCs.     She was last seen in our office on 23.  We decided to switch to Simvastatin to Atorvastatin 10 mg daily. She was to have repeat cholesterol levels completed with her PCP. She was to monitor her BP. I did not make any other changes to her medical regimen. She was to follow up in 6 months.     Weight 142 lbs on .     Today, her  has accompanied her to the visit. She denies any adverse reactions with starting the Atorvastatin 10 mg daily. She reports shortness of breath with going up steps. She does not exercise. She does not check her BP at home. She reports stopping the Losartan 25 mg daily. She denies any chest pain, palpitations, dizziness, lightheadedness, near syncope or syncopal episodes.     Past Medical History:   Diagnosis Date   • Alcoholism (CMS/[facility])    • Anxiety    • Hypertension    • Lipid disorder        Past Surgical History:   Procedure Laterality Date   • REPLACEMENT TOTAL HIP LATERAL POSITION Right    • TOTAL SHOULDER REPLACEMENT Right        Social History     Tobacco Use   • Smoking status: Former     Packs/day: 1.00     Years: 13.00     Pack years: 13.00     Types: Cigarettes     Quit date:      Years since quittin.4   • Smokeless tobacco: Never   • Tobacco comments:     smoked socially   Vaping Use   • Vaping Use: Never used   Substance Use Topics   • Alcohol use: Yes     Alcohol/week: 21.0 standard drinks of alcohol     Types: 21 Glasses of wine per week     Comment: Drinks 2-3 glasses of white wine daily since she was in her early 50s   • Drug use: Never       Family History   Problem Relation Age of Onset   • Heart disease Biological Father    • Heart attack  Biological Sister    • Heart disease Biological Sister    • Other Biological Sister         PPM   • Heart disease Biological Brother        Allergies:  Morphine and Sulfa (sulfonamide antibiotics)    Current Outpatient Medications   Medication Sig Dispense Refill   • atorvastatin (LIPITOR) 20 mg tablet Take 1 tablet (20 mg total) by mouth daily. 90 tablet 1   • cholecalciferol, vitamin D3, 1,000 unit (25 mcg) tablet Take 1,000 Units by mouth daily.     • coenzyme Q10 (COQ10) 100 mg capsule Take 100 mg by mouth daily.     • cyanocobalamin (VITAMIN B12) 1,000 mcg tablet Take 1 tablet (1,000 mcg total) by mouth daily. 30 tablet 0   • folic acid (FOLVITE) 1 mg tablet Take 1 tablet (1 mg total) by mouth daily. 30 tablet 0   • thiamine 100 mg tablet Take 1 tablet (100 mg total) by mouth daily. 30 tablet 0     No current facility-administered medications for this visit.       Review of Systems   Cardiovascular: Positive for dyspnea on exertion. Negative for chest pain, irregular heartbeat, leg swelling, near-syncope, palpitations and syncope.   Respiratory: Negative for shortness of breath.    Neurological: Negative for dizziness and light-headedness.       Objective     Vitals:    06/21/23 1525   BP: 131/81   Pulse: 83   SpO2: 98%       Wt Readings from Last 3 Encounters:   06/21/23 67.7 kg (149 lb 3.2 oz)   01/04/23 64.4 kg (142 lb)   08/23/22 68.9 kg (152 lb)       Body mass index is 25.61 kg/m².  Body surface area is 1.75 meters squared.    Physical Exam  Constitutional:       Appearance: Normal appearance.   Neck:      Vascular: No carotid bruit or JVD.   Cardiovascular:      Rate and Rhythm: Normal rate and regular rhythm.      Pulses: Normal pulses.      Heart sounds: Normal heart sounds. No murmur heard.  Pulmonary:      Effort: Pulmonary effort is normal. No respiratory distress.      Breath sounds: Normal breath sounds.   Musculoskeletal:         General: No swelling.      Right lower leg: No edema.      Left  lower leg: Edema present.   Skin:     General: Skin is warm and dry.   Neurological:      General: No focal deficit present.      Mental Status: She is alert and oriented to person, place, and time.         ECG   Normal sinus rhythm   Poor R wave progression   Abnormal ECG   When compared with ECG of 23-AUG-2022 02:03,   Questionable change in QRS axis     Cardiac Imaging    TRANSTHORACIC ECHO (TTE) COMPLETE 07/11/2022    Interpretation Summary  · Normal-sized LV. Normal LV systolic function. Estimated EF 60- 65%. Wall motion appears grossly normal. Normal LV wall thickness. Normal diastolic filling pattern for age of the LV.  · Normal-sized LA.  · No evidence of pericardial effusion.  · Tricuspid valve structure is grossly normal. Mild to moderate tricuspid valve regurgitation.  · Estimated RVSP = 32 mmHg (assuming a right atrial pressure of 3 mmHg).  · Grossly normal pulmonic valve structure. Trace pulmonic valve regurgitation. No pulmonic valve stenosis.  · Tricuspid aortic valve. No aortic valve regurgitation. No aortic valve stenosis.  · Normal-sized RV. Normal RV systolic function.  · Normal-sized RA.  · Grossly normal leaflet structure of the mitral valve.  · No significant mitral valve regurgitation.  · No significant mitral valve stenosis.  · IVC not well visualized.  · No previous echocardiogram available for comparison.      Hematology  Lab Results   Component Value Date    WBC 6.66 02/21/2023    HGB 13.8 02/21/2023    HCT 41.7 02/21/2023     02/21/2023    INR 1.1 08/23/2022       Chemistries  Lab Results   Component Value Date     02/21/2023    K 4.3 02/21/2023     02/21/2023    CREATININE 0.8 02/21/2023    BUN 25 (H) 02/21/2023    CO2 25 02/21/2023    GLUCOSE 108 (H) 02/21/2023    CALCIUM 9.6 02/21/2023    MG 1.9 08/25/2022    ALT 19 08/23/2022    AST 29 08/23/2022       Cholesterol  No results found for: CHOL, TRIG, HDL, LDLCALC, NONHDLCALC    Endocrine  Lab Results   Component Value  Date    TSH 1.23 08/23/2022           Assessment/Plan     Dyspnea on exertion  She continues to have dyspnea on exertion.  This could be an anginal equivalent.  CT scan from 2/21/2023 reviewed by me, evidence of calcified plaque adjacent to the left main coronary artery.  I discussed this finding with patient.  There is no evidence of active ischemia at this time. Patient is asymptomatic and hemodynamically stable.  There are no clinical findings consistent with decompensated heart failure at this time.  We decided to rule out left main coronary artery obstruction with a CT coronary angiogram.  Patient was counseled to call 911 if the symptoms recur or develops new symptoms.    Primary hypertension  Blood pressure is overall acceptable.  She stopped the losartan.  Continue low-salt diet and minimize the use of alcohol.  We discussed about the guideline recommended goal systolic blood pressure less than 120 mmHg and diastolic blood pressure less than 80 mmHg. We discussed about the proper way of checking the blood pressure and a video from the American [employer] was shown to patient.  Patient will monitor the blood pressure and let me know.    PVC's (premature ventricular contractions)  Denies any significant palpitations since previous visit.  Rule out obstructive coronary artery disease as above.    Other hyperlipidemia  On 3/31/2023 her LDL was 131.  I discussed this finding with patient.  I discussed with patient and she agrees to increase the atorvastatin but only to 20 mg daily.  We will check a lipid profile.        Orders Placed This Encounter   Procedures   • CTA CORONARY ARTERY WITH IV CONTRAST     Standing Status:   Future     Standing Expiration Date:   6/21/2024     Order Specific Question:   Release to patient     Answer:   Immediate   • Comprehensive metabolic panel     Standing Status:   Future     Standing Expiration Date:   6/21/2024     Order Specific Question:   Release to patient      Answer:   Immediate   • Magnesium     Standing Status:   Future     Standing Expiration Date:   6/21/2024     Order Specific Question:   Release to patient     Answer:   Immediate   • Lipid panel     Standing Status:   Future     Standing Expiration Date:   6/21/2024     Order Specific Question:   Release to patient     Answer:   Immediate   • [facility] MUSE ECG 12 lead (clinic performed)     Scheduling Instructions:      PLEASE USE THIS ORDER FOR ECG'S PERFORMED IN PHYSICIAN OFFICES     Order Specific Question:   Release to patient     Answer:   Immediate            This note was completed in part utilizing a voice recognition software. Grammatical errors, random word insertion, spelling mistakes, and incomplete sentences may be an occasional consequence of the system secondary to software limitations, ambient noise and hardware issues.   Please read the chart carefully and recognize, using context, where substitutions have occurred. If you have any questions or concerns about the context, text or information contained within the body of this dictation, please contact myself, the provider, for further clarification.     IMeek, am scribing for, and in the presence of, Kana Karimi MD.      I, Kana Karimi MD, personally performed the services described in this documentation as scribed by Meek Mora in my presence, and it is both accurate and complete.       Kana Karimi MD  6/21/2023

## 2023-06-21 ENCOUNTER — TELEPHONE (OUTPATIENT)
Dept: CARDIOLOGY | Facility: CLINIC | Age: 73
End: 2023-06-21

## 2023-06-21 ENCOUNTER — OFFICE VISIT (OUTPATIENT)
Dept: CARDIOLOGY | Facility: CLINIC | Age: 73
End: 2023-06-21
Payer: COMMERCIAL

## 2023-06-21 VITALS
WEIGHT: 149.2 LBS | HEART RATE: 83 BPM | HEIGHT: 64 IN | BODY MASS INDEX: 25.47 KG/M2 | SYSTOLIC BLOOD PRESSURE: 131 MMHG | DIASTOLIC BLOOD PRESSURE: 81 MMHG | OXYGEN SATURATION: 98 %

## 2023-06-21 DIAGNOSIS — R06.09 DYSPNEA ON EXERTION: ICD-10-CM

## 2023-06-21 DIAGNOSIS — R06.02 SHORTNESS OF BREATH: ICD-10-CM

## 2023-06-21 DIAGNOSIS — I10 PRIMARY HYPERTENSION: ICD-10-CM

## 2023-06-21 DIAGNOSIS — E78.49 OTHER HYPERLIPIDEMIA: ICD-10-CM

## 2023-06-21 DIAGNOSIS — I49.3 PVC'S (PREMATURE VENTRICULAR CONTRACTIONS): Primary | ICD-10-CM

## 2023-06-21 LAB
ATRIAL RATE: 85
P AXIS: 81
PR INTERVAL: 120
QRS DURATION: 88
QT INTERVAL: 412
QTC CALCULATION(BAZETT): 490
R AXIS: -12
T WAVE AXIS: 46
VENTRICULAR RATE: 85

## 2023-06-21 PROCEDURE — 93000 ELECTROCARDIOGRAM COMPLETE: CPT | Performed by: INTERNAL MEDICINE

## 2023-06-21 PROCEDURE — 3075F SYST BP GE 130 - 139MM HG: CPT | Performed by: INTERNAL MEDICINE

## 2023-06-21 PROCEDURE — 3008F BODY MASS INDEX DOCD: CPT | Performed by: INTERNAL MEDICINE

## 2023-06-21 PROCEDURE — 3079F DIAST BP 80-89 MM HG: CPT | Performed by: INTERNAL MEDICINE

## 2023-06-21 PROCEDURE — 99214 OFFICE O/P EST MOD 30 MIN: CPT | Performed by: INTERNAL MEDICINE

## 2023-06-21 RX ORDER — ATORVASTATIN CALCIUM 20 MG/1
20 TABLET, FILM COATED ORAL DAILY
Qty: 90 TABLET | Refills: 1 | Status: SHIPPED | OUTPATIENT
Start: 2023-06-21 | End: 2024-04-10 | Stop reason: SDUPTHER

## 2023-06-21 ASSESSMENT — ENCOUNTER SYMPTOMS
SYNCOPE: 0
DYSPNEA ON EXERTION: 1
NEAR-SYNCOPE: 0
SHORTNESS OF BREATH: 0
DIZZINESS: 0
IRREGULAR HEARTBEAT: 0
PALPITATIONS: 0
LIGHT-HEADEDNESS: 0

## 2023-06-21 NOTE — TELEPHONE ENCOUNTER
The patient is being ordered a CTA CORONARY ARTERY WITH IV CONTRAST  CPT: 46036   To be done at the [facility].  The patient had [facility] for insurance.  Can you please try and get this approved?  Thank you      [facility] MEDICARE ADVANTAGE  [id number]      Primary Visit Coverage Subscriber    ID Name SSN Address   [id number] LIBIA LOZANO

## 2023-06-21 NOTE — LETTER
June 21, 2023     Meek Yarbrough MD, MD  [address] [place] [address]    Patient: Rena Ramirez  YOB: 1950  Date of Visit: 6/21/2023      Dear Dr. Linnea MD:    Thank you for referring Rena Ramirez to me for evaluation. Below are my notes for this consultation.    If you have questions, please do not hesitate to call me. I look forward to following your patient along with you.         Sincerely,        Kana Karimi MD        CC: No Recipients    Kana Karimi MD  6/21/2023  4:37 PM  Signed       Cardiology  Office Progress Note         Reason for visit:   Chief Complaint   Patient presents with   • Follow-up       HPI     Rena Ramirez is a 73 y.o. female who presents to the office for cardiovascular follow up and management of other HLD, shortness of breath, HTN, alcoholism, and PVCs.     She was last seen in our office on 1/4/23.  We decided to switch to Simvastatin to Atorvastatin 10 mg daily. She was to have repeat cholesterol levels completed with her PCP. She was to monitor her BP. I did not make any other changes to her medical regimen. She was to follow up in 6 months.     Weight 142 lbs on 1/4.     Today, her  has accompanied her to the visit. She denies any adverse reactions with starting the Atorvastatin 10 mg daily. She reports shortness of breath with going up steps. She does not exercise. She does not check her BP at home. She reports stopping the Losartan 25 mg daily. She denies any chest pain, palpitations, dizziness, lightheadedness, near syncope or syncopal episodes.     Past Medical History:   Diagnosis Date   • Alcoholism (CMS/HCC)    • Anxiety    • Hypertension    • Lipid disorder        Past Surgical History:   Procedure Laterality Date   • REPLACEMENT TOTAL HIP LATERAL POSITION Right    • TOTAL SHOULDER REPLACEMENT Right        Social History     Tobacco Use   • Smoking status: Former     Packs/day: 1.00     Years: 13.00     Pack years:  13.00     Types: Cigarettes     Quit date:      Years since quittin.4   • Smokeless tobacco: Never   • Tobacco comments:     smoked socially   Vaping Use   • Vaping Use: Never used   Substance Use Topics   • Alcohol use: Yes     Alcohol/week: 21.0 standard drinks of alcohol     Types: 21 Glasses of wine per week     Comment: Drinks 2-3 glasses of white wine daily since she was in her early 50s   • Drug use: Never       Family History   Problem Relation Age of Onset   • Heart disease Biological Father    • Heart attack Biological Sister    • Heart disease Biological Sister    • Other Biological Sister         PPM   • Heart disease Biological Brother        Allergies:  Morphine and Sulfa (sulfonamide antibiotics)    Current Outpatient Medications   Medication Sig Dispense Refill   • atorvastatin (LIPITOR) 20 mg tablet Take 1 tablet (20 mg total) by mouth daily. 90 tablet 1   • cholecalciferol, vitamin D3, 1,000 unit (25 mcg) tablet Take 1,000 Units by mouth daily.     • coenzyme Q10 (COQ10) 100 mg capsule Take 100 mg by mouth daily.     • cyanocobalamin (VITAMIN B12) 1,000 mcg tablet Take 1 tablet (1,000 mcg total) by mouth daily. 30 tablet 0   • folic acid (FOLVITE) 1 mg tablet Take 1 tablet (1 mg total) by mouth daily. 30 tablet 0   • thiamine 100 mg tablet Take 1 tablet (100 mg total) by mouth daily. 30 tablet 0     No current facility-administered medications for this visit.       Review of Systems   Cardiovascular: Positive for dyspnea on exertion. Negative for chest pain, irregular heartbeat, leg swelling, near-syncope, palpitations and syncope.   Respiratory: Negative for shortness of breath.    Neurological: Negative for dizziness and light-headedness.       Objective     Vitals:    23 1525   BP: 131/81   Pulse: 83   SpO2: 98%       Wt Readings from Last 3 Encounters:   23 67.7 kg (149 lb 3.2 oz)   23 64.4 kg (142 lb)   22 68.9 kg (152 lb)       Body mass index is 25.61  kg/m².  Body surface area is 1.75 meters squared.    Physical Exam  Constitutional:       Appearance: Normal appearance.   Neck:      Vascular: No carotid bruit or JVD.   Cardiovascular:      Rate and Rhythm: Normal rate and regular rhythm.      Pulses: Normal pulses.      Heart sounds: Normal heart sounds. No murmur heard.  Pulmonary:      Effort: Pulmonary effort is normal. No respiratory distress.      Breath sounds: Normal breath sounds.   Musculoskeletal:         General: No swelling.      Right lower leg: No edema.      Left lower leg: Edema present.   Skin:     General: Skin is warm and dry.   Neurological:      General: No focal deficit present.      Mental Status: She is alert and oriented to person, place, and time.         ECG   Normal sinus rhythm   Poor R wave progression   Abnormal ECG   When compared with ECG of 23-AUG-2022 02:03,   Questionable change in QRS axis     Cardiac Imaging    TRANSTHORACIC ECHO (TTE) COMPLETE 07/11/2022    Interpretation Summary  · Normal-sized LV. Normal LV systolic function. Estimated EF 60- 65%. Wall motion appears grossly normal. Normal LV wall thickness. Normal diastolic filling pattern for age of the LV.  · Normal-sized LA.  · No evidence of pericardial effusion.  · Tricuspid valve structure is grossly normal. Mild to moderate tricuspid valve regurgitation.  · Estimated RVSP = 32 mmHg (assuming a right atrial pressure of 3 mmHg).  · Grossly normal pulmonic valve structure. Trace pulmonic valve regurgitation. No pulmonic valve stenosis.  · Tricuspid aortic valve. No aortic valve regurgitation. No aortic valve stenosis.  · Normal-sized RV. Normal RV systolic function.  · Normal-sized RA.  · Grossly normal leaflet structure of the mitral valve.  · No significant mitral valve regurgitation.  · No significant mitral valve stenosis.  · IVC not well visualized.  · No previous echocardiogram available for comparison.      Hematology  Lab Results   Component Value Date    WBC  6.66 02/21/2023    HGB 13.8 02/21/2023    HCT 41.7 02/21/2023     02/21/2023    INR 1.1 08/23/2022       Chemistries  Lab Results   Component Value Date     02/21/2023    K 4.3 02/21/2023     02/21/2023    CREATININE 0.8 02/21/2023    BUN 25 (H) 02/21/2023    CO2 25 02/21/2023    GLUCOSE 108 (H) 02/21/2023    CALCIUM 9.6 02/21/2023    MG 1.9 08/25/2022    ALT 19 08/23/2022    AST 29 08/23/2022       Cholesterol  No results found for: CHOL, TRIG, HDL, LDLCALC, NONHDLCALC    Endocrine  Lab Results   Component Value Date    TSH 1.23 08/23/2022           Assessment/Plan     Dyspnea on exertion  She continues to have dyspnea on exertion.  This could be an anginal equivalent.  CT scan from 2/21/2023 reviewed by me, evidence of calcified plaque adjacent to the left main coronary artery.  I discussed this finding with patient.  There is no evidence of active ischemia at this time. Patient is asymptomatic and hemodynamically stable.  There are no clinical findings consistent with decompensated heart failure at this time.  We decided to rule out left main coronary artery obstruction with a CT coronary angiogram.  Patient was counseled to call 911 if the symptoms recur or develops new symptoms.    Primary hypertension  Blood pressure is overall acceptable.  She stopped the losartan.  Continue low-salt diet and minimize the use of alcohol.  We discussed about the guideline recommended goal systolic blood pressure less than 120 mmHg and diastolic blood pressure less than 80 mmHg. We discussed about the proper way of checking the blood pressure and a video from the [employer] was shown to patient.  Patient will monitor the blood pressure and let me know.    PVC's (premature ventricular contractions)  Denies any significant palpitations since previous visit.  Rule out obstructive coronary artery disease as above.    Other hyperlipidemia  On 3/31/2023 her LDL was 131.  I discussed this finding  with patient.  I discussed with patient and she agrees to increase the atorvastatin but only to 20 mg daily.  We will check a lipid profile.        Orders Placed This Encounter   Procedures   • CTA CORONARY ARTERY WITH IV CONTRAST     Standing Status:   Future     Standing Expiration Date:   6/21/2024     Order Specific Question:   Release to patient     Answer:   Immediate   • Comprehensive metabolic panel     Standing Status:   Future     Standing Expiration Date:   6/21/2024     Order Specific Question:   Release to patient     Answer:   Immediate   • Magnesium     Standing Status:   Future     Standing Expiration Date:   6/21/2024     Order Specific Question:   Release to patient     Answer:   Immediate   • Lipid panel     Standing Status:   Future     Standing Expiration Date:   6/21/2024     Order Specific Question:   Release to patient     Answer:   Immediate   • [facility] MUSE ECG 12 lead (clinic performed)     Scheduling Instructions:      PLEASE USE THIS ORDER FOR ECG'S PERFORMED IN PHYSICIAN OFFICES     Order Specific Question:   Release to patient     Answer:   Immediate           This note was completed in part utilizing a voice recognition software. Grammatical errors, random word insertion, spelling mistakes, and incomplete sentences may be an occasional consequence of the system secondary to software limitations, ambient noise and hardware issues.   Please read the chart carefully and recognize, using context, where substitutions have occurred. If you have any questions or concerns about the context, text or information contained within the body of this dictation, please contact myself, the provider, for further clarification.     I, Meek Mora, am scribing for, and in the presence of, Kana Karimi MD.      IKana MD, personally performed the services described in this documentation as scribed by Meek Mora in my presence, and it is both accurate and complete.       Kana Karimi  MD  6/21/2023

## 2023-06-21 NOTE — ASSESSMENT & PLAN NOTE
On 3/31/2023 her LDL was 131.  I discussed this finding with patient.  I discussed with patient and she agrees to increase the atorvastatin but only to 20 mg daily.  We will check a lipid profile.

## 2023-06-21 NOTE — ASSESSMENT & PLAN NOTE
Blood pressure is overall acceptable.  She stopped the losartan.  Continue low-salt diet and minimize the use of alcohol.  We discussed about the guideline recommended goal systolic blood pressure less than 120 mmHg and diastolic blood pressure less than 80 mmHg. We discussed about the proper way of checking the blood pressure and a video from the [employer] was shown to patient.  Patient will monitor the blood pressure and let me know.

## 2023-06-21 NOTE — ASSESSMENT & PLAN NOTE
Denies any significant palpitations since previous visit.  Rule out obstructive coronary artery disease as above.

## 2023-06-21 NOTE — ASSESSMENT & PLAN NOTE
She continues to have dyspnea on exertion.  This could be an anginal equivalent.  CT scan from 2/21/2023 reviewed by me, evidence of calcified plaque adjacent to the left main coronary artery.  I discussed this finding with patient.  There is no evidence of active ischemia at this time. Patient is asymptomatic and hemodynamically stable.  There are no clinical findings consistent with decompensated heart failure at this time.  We decided to rule out left main coronary artery obstruction with a CT coronary angiogram.  Patient was counseled to call 911 if the symptoms recur or develops new symptoms.

## 2023-06-23 NOTE — TELEPHONE ENCOUNTER
[facility]/ CTA    NPR per plan-Ref# [id number]    Please provide the pt the auth # and assist with scheduling testing

## 2023-07-11 ENCOUNTER — APPOINTMENT (OUTPATIENT)
Dept: URBAN - METROPOLITAN AREA CLINIC 203 | Age: 73
Setting detail: DERMATOLOGY
End: 2023-07-19

## 2023-07-11 DIAGNOSIS — L71.8 OTHER ROSACEA: ICD-10-CM

## 2023-07-11 PROCEDURE — OTHER PRESCRIPTION MEDICATION MANAGEMENT: OTHER

## 2023-07-11 PROCEDURE — 99213 OFFICE O/P EST LOW 20 MIN: CPT

## 2023-07-11 ASSESSMENT — LOCATION SIMPLE DESCRIPTION DERM
LOCATION SIMPLE: LEFT CHEEK
LOCATION SIMPLE: RIGHT CHEEK

## 2023-07-11 ASSESSMENT — LOCATION ZONE DERM: LOCATION ZONE: FACE

## 2023-07-11 ASSESSMENT — LOCATION DETAILED DESCRIPTION DERM
LOCATION DETAILED: LEFT CENTRAL MALAR CHEEK
LOCATION DETAILED: RIGHT CENTRAL MALAR CHEEK

## 2023-07-11 NOTE — PROCEDURE: PRESCRIPTION MEDICATION MANAGEMENT
Render In Strict Bullet Format?: No
Continue Regimen: metronidazole 1 % topical gel Qd\\n\\n\\ndoxycycline hyclate 100 mg capsule - complete remaining 10 tablets
Detail Level: Zone

## 2023-07-12 DIAGNOSIS — R06.09 DYSPNEA ON EXERTION: ICD-10-CM

## 2023-07-12 DIAGNOSIS — I49.3 PVC'S (PREMATURE VENTRICULAR CONTRACTIONS): Primary | ICD-10-CM

## 2023-07-13 ENCOUNTER — APPOINTMENT (OUTPATIENT)
Dept: LAB | Facility: HOSPITAL | Age: 73
End: 2023-07-13
Attending: INTERNAL MEDICINE
Payer: COMMERCIAL

## 2023-07-13 DIAGNOSIS — R06.09 DYSPNEA ON EXERTION: ICD-10-CM

## 2023-07-13 DIAGNOSIS — I49.3 PVC'S (PREMATURE VENTRICULAR CONTRACTIONS): ICD-10-CM

## 2023-07-13 LAB
BUN SERPL-MCNC: 21 MG/DL (ref 7–25)
CREAT SERPL-MCNC: 0.9 MG/DL (ref 0.6–1.2)
GFR SERPL CREATININE-BSD FRML MDRD: >60 ML/MIN/1.73M*2

## 2023-07-13 PROCEDURE — 82565 ASSAY OF CREATININE: CPT

## 2023-07-13 PROCEDURE — 36415 COLL VENOUS BLD VENIPUNCTURE: CPT

## 2023-07-13 PROCEDURE — 84520 ASSAY OF UREA NITROGEN: CPT

## 2023-07-19 ENCOUNTER — HOSPITAL ENCOUNTER (OUTPATIENT)
Dept: RADIOLOGY | Facility: HOSPITAL | Age: 73
Discharge: HOME | End: 2023-07-19
Attending: INTERNAL MEDICINE
Payer: COMMERCIAL

## 2023-07-19 VITALS
OXYGEN SATURATION: 94 % | HEIGHT: 64 IN | RESPIRATION RATE: 16 BRPM | DIASTOLIC BLOOD PRESSURE: 74 MMHG | SYSTOLIC BLOOD PRESSURE: 122 MMHG | HEART RATE: 52 BPM | WEIGHT: 149 LBS | BODY MASS INDEX: 25.44 KG/M2

## 2023-07-19 DIAGNOSIS — R06.09 DYSPNEA ON EXERTION: ICD-10-CM

## 2023-07-19 PROCEDURE — 63700000 HC SELF-ADMINISTRABLE DRUG: Performed by: INTERNAL MEDICINE

## 2023-07-19 PROCEDURE — 63600105 HC IODINE BASED CONTRAST: Performed by: INTERNAL MEDICINE

## 2023-07-19 PROCEDURE — G1004 CDSM NDSC: HCPCS

## 2023-07-19 RX ORDER — METOPROLOL TARTRATE 1 MG/ML
5 INJECTION, SOLUTION INTRAVENOUS AS NEEDED
Status: DISCONTINUED | OUTPATIENT
Start: 2023-07-19 | End: 2023-07-20 | Stop reason: HOSPADM

## 2023-07-19 RX ORDER — METOPROLOL TARTRATE 50 MG/1
50 TABLET ORAL EVERY 30 MIN PRN
Status: DISCONTINUED | OUTPATIENT
Start: 2023-07-19 | End: 2023-07-20 | Stop reason: HOSPADM

## 2023-07-19 RX ORDER — NITROGLYCERIN 0.4 MG/1
0.4 TABLET SUBLINGUAL ONCE
Status: COMPLETED | OUTPATIENT
Start: 2023-07-19 | End: 2023-07-19

## 2023-07-19 RX ORDER — METOPROLOL TARTRATE 50 MG/1
50 TABLET ORAL ONCE
Status: COMPLETED | OUTPATIENT
Start: 2023-07-19 | End: 2023-07-19

## 2023-07-19 RX ADMIN — METOPROLOL TARTRATE 50 MG: 50 TABLET, FILM COATED ORAL at 12:54

## 2023-07-19 RX ADMIN — NITROGLYCERIN 0.4 MG: 0.4 TABLET SUBLINGUAL at 13:22

## 2023-07-19 RX ADMIN — IOHEXOL 90 ML: 350 INJECTION, SOLUTION INTRAVENOUS at 13:30

## 2023-07-19 NOTE — PROGRESS NOTES
Staff present during Radiology Nurse encounter:    Nurse: CHAVEZ Cantor  Technologist: GALA Calle    Cardiologist: ZAFAR Dockery  Other: 20 g hi flow iv right ac 90 contrast  Cardiac CTA Worksheet    Chest pain (symptomatic patients):  Intermediate pre-test probability of Coronary Artery Disease        metoprolol tartrate (LOPRESSOR) 50mg and nitroglycerin (NITROSTAT) .4mg      
Improved

## 2023-07-19 NOTE — DISCHARGE INSTRUCTIONS
Rena CEDEÑOCriselda James   [id number]   07/19/23    Cardiac CTA Patient Discharge Instructions      Thank you for allowing our CT staff to participate in your care today.  We would like to provide you with some easy to follow instructions before you leave.    DRINK PLENTY OF FLUIDS  Now that your scan is complete, you will need to drink plenty of fluids, preferably water.    DO NOT drink any caffeinated beverages the rest of the day (coffee, tea, caffeinated sodas).    DO NOT drink any alcoholic beverages for the next 24 hours.  We ask you to drink these fluids to dilute the x-ray dye that was used for your scan and allow it to flush through your kidneys.  Caffeine and alcohol will not allow this flushing to occur effectively.       MEDICATION CHANGES:  {YES/NO/WILD CARDS:26420}    If you have any questions about this, please contact your primary care physician.    If you need immediate medical attention, please go to the nearest Emergency Department or dial 911.    By signing this form, I acknowledge these instructions have been explained to me to my satisfaction and all my questions have been answered.  I have received a copy of this form.

## 2023-07-20 ENCOUNTER — TELEPHONE (OUTPATIENT)
Dept: CARDIOLOGY | Facility: CLINIC | Age: 73
End: 2023-07-20
Payer: COMMERCIAL

## 2023-07-20 NOTE — RESULT ENCOUNTER NOTE
Can you please let the patient know that there were no significant blockages in the coronary arteries.  Continue atorvastatin.  We will discuss in more detail during our next visit.

## 2023-07-20 NOTE — TELEPHONE ENCOUNTER
Spoke to patient.     ----- Message from Kana Karimi MD sent at 7/20/2023 11:19 AM EDT -----  Can you please let the patient know that there were no significant blockages in the coronary arteries.  Continue atorvastatin.  We will discuss in more detail during our next visit.

## 2023-09-11 NOTE — PROGRESS NOTES
Cardiology  Office Progress Note         Reason for visit:   Chief Complaint   Patient presents with    Follow-up       HPI     Rena Ramirez is a 73 y.o. female who presents to the office for cardiovascular follow up and management of dyspnea on exertion, HTN, PVCs, and other HLD.     She was last seen in our office on 23. I planned for her to have a CT coronary angiogram. She was to monitor her BP. I advised her to increase the Atorvastatin to 20 mg daily and recheck a FLP. I did not make any other changes to her medical regimen. She was to follow up in 3 months.     Weight 149 lbs on      Today, she reports right chest wall pain that has occurred 4-5 times in the last month. She notes it will resolve within 2 minutes. She will have shortness of breath with steps. She will occasionally have leg cramps 1-2 times a month with the Atorvastatin and is on COQ10 when the leg cramps happen. BP initially 147/84 and BP recheck was 135/84. She reports her BP is elevated due to stress as her  is currently in the ER and she has not taken any medications yet today. She denies any chest pain, palpitations, shortness of breath, dizziness, lightheadedness, near syncope or syncopal episodes.     Past Medical History:   Diagnosis Date    Alcoholism (CMS/HCC)     Allergic     Anxiety     Hypertension     Lipid disorder        Past Surgical History:   Procedure Laterality Date    REPLACEMENT TOTAL HIP LATERAL POSITION Right     TOTAL SHOULDER REPLACEMENT Right        Social History     Tobacco Use    Smoking status: Former     Packs/day: 1.00     Years: 13.00     Additional pack years: 0.00     Total pack years: 13.00     Types: Cigarettes     Quit date:      Years since quittin.7    Smokeless tobacco: Never    Tobacco comments:     smoked socially   Vaping Use    Vaping Use: Never used   Substance Use Topics    Alcohol use: Yes     Alcohol/week: 21.0 standard drinks of alcohol      Types: 21 Glasses of wine per week     Comment: Drinks 2-3 glasses of white wine daily since she was in her early 50s    Drug use: Never       Family History   Problem Relation Age of Onset    Heart disease Biological Father     Heart attack Biological Sister     Heart disease Biological Sister     Other Biological Sister         PPM    Heart disease Biological Brother        Allergies:  Morphine and Sulfa (sulfonamide antibiotics)    Current Outpatient Medications   Medication Sig Dispense Refill    atorvastatin (LIPITOR) 20 mg tablet Take 1 tablet (20 mg total) by mouth daily. 90 tablet 1    cholecalciferol, vitamin D3, 1,000 unit (25 mcg) tablet Take 1,000 Units by mouth daily.      coenzyme Q10 (COQ10) 100 mg capsule Take 100 mg by mouth daily. Occasional per patient.      cyanocobalamin (VITAMIN B12) 1,000 mcg tablet Take 1 tablet (1,000 mcg total) by mouth daily. 30 tablet 0    losartan (COZAAR) 50 mg tablet Take 50 mg by mouth daily.      pantoprazole (PROTONIX) 20 mg EC tablet Take 20 mg by mouth daily with breakfast.      thiamine 100 mg tablet Take 1 tablet (100 mg total) by mouth daily. 30 tablet 0     No current facility-administered medications for this visit.       Review of Systems   Cardiovascular: Positive for dyspnea on exertion. Negative for chest pain, irregular heartbeat, leg swelling, near-syncope, palpitations and syncope.   Respiratory: Negative for shortness of breath.    Neurological: Negative for dizziness and light-headedness.       Objective     Vitals:    10/04/23 1426   BP: 135/84   Pulse: 75   SpO2: 95%       Wt Readings from Last 3 Encounters:   10/04/23 66.7 kg (147 lb)   07/19/23 67.6 kg (149 lb)   06/21/23 67.7 kg (149 lb 3.2 oz)       Body mass index is 25.23 kg/m².  Body surface area is 1.74 meters squared.    Physical Exam  Constitutional:       Appearance: Normal appearance.   Neck:      Vascular: No carotid bruit or JVD.   Cardiovascular:      Rate and Rhythm:  Normal rate and regular rhythm.      Pulses: Normal pulses.      Heart sounds: Normal heart sounds. No murmur heard.  Pulmonary:      Effort: Pulmonary effort is normal. No respiratory distress.      Breath sounds: Normal breath sounds. No wheezing or rales.   Musculoskeletal:         General: No swelling.      Right lower leg: No edema.      Left lower leg: No edema.   Skin:     General: Skin is warm and dry.   Neurological:      General: No focal deficit present.      Mental Status: She is alert and oriented to person, place, and time.         ECG   Normal sinus rhythm   Rightward axis   Borderline ECG   When compared with ECG of 21-JUN-2023 15:35,   No significant change    Cardiac Imaging    TRANSTHORACIC ECHO (TTE) COMPLETE 07/11/2022    Interpretation Summary  · Normal-sized LV. Normal LV systolic function. Estimated EF 60- 65%. Wall motion appears grossly normal. Normal LV wall thickness. Normal diastolic filling pattern for age of the LV.  · Normal-sized LA.  · No evidence of pericardial effusion.  · Tricuspid valve structure is grossly normal. Mild to moderate tricuspid valve regurgitation.  · Estimated RVSP = 32 mmHg (assuming a right atrial pressure of 3 mmHg).  · Grossly normal pulmonic valve structure. Trace pulmonic valve regurgitation. No pulmonic valve stenosis.  · Tricuspid aortic valve. No aortic valve regurgitation. No aortic valve stenosis.  · Normal-sized RV. Normal RV systolic function.  · Normal-sized RA.  · Grossly normal leaflet structure of the mitral valve.  · No significant mitral valve regurgitation.  · No significant mitral valve stenosis.  · IVC not well visualized.  · No previous echocardiogram available for comparison.      CTA CORONARY ARTERY WITH IV CONTRAST 07/19/2023    Narrative  CLINICAL HISTORY: Dyspnea on exertion    COMMENT:  1).  PROCEDURE:  The patient was brought to the CT prep room in stable  condition.  The patient was n.p.o. for 4 hours.  An 20 gauge intracath IV  was  started in the right antecubital space.  The patient's heart rate and blood  pressure were recorded.  Per protocol, Ivabradine  0 mgs was given orally  followed by Metoprolol 50  mgs given orally and additional Metoprolol 0 mgs  intravenously.   Sublingual Nitroglycerin was administered before contrast  injection.  Multi-detector CT of the chest/heart was then performed following 90  cc of Omnipaque 350 iodine contrast administered intravenously.  A prospective  ECG gated and 100  kVp technique was utilized to limit radiation exposure to the  patient. Post processing for 3D evaluation was completed and sent to [facility] workstation for interpretation. The overall quality of the scan was  good. The patient tolerated the procedure well and was discharged in stable  condition.    2)  CARDIAC FINDINGS:    CORONARY CALCIUM COMPOSITE AGATSTON SCORE:  14  LM: 0                    LAD: 14                    LCX: 0  RCA: 0  This places the patient in the OSMAN 47th risk percentile for age and gender  matched individuals.    CORONARY ARTERIES:    LEFT MAIN: Patent.    LAD:  Proximal: Patent. Small specks of extraluminal calcification noted  Mid: Patent.  Distal: Patent.  DIAG 1: Patent.      RAMUS: Patent.    LCX:  Proximal: Patent.  Mid: Patent.  Distal: Patent.  OM1:  Patent.  OM2:  Patent.    RCA:  Proximal: Patent.  Mid: Patent.  Distal: Patent.  RPDA: Patent.  RPLB: Patent.    DOMINANCE: Right.  **Lesion severity (stenosis):  Normal (patent), Minimal <30%, Mild 30-50%,  Moderate 50-70%, Severe 70-99%, Occluded >99%.    CARDIAC MORPHOLOGY:  LEFT VENTRICLE: Grossly normal  RIGHT VENTRICLE: Grossly normal  LEFT ATRIUM: Grossly normal, normal pulmonary vein configuration  LEFT ATRIAL APPENDAGE: No evidence of filling defects; limited assessment as  this is not a delayed enhancement study  RIGHT ATRIUM: Grossly normal  AORTIC VALVE: Trileaflet  MITRAL VALVE: Grossly normal  TRICUSPID VALVE: Grossly normal  PULMONIC  VALVE: Grossly normal  PERICARDIUM: Grossly normal    ASCENDING THORACIC AORTA:Grossly normal measuring 30 x 31 mm  AORTIC ROOT: Grossly normal with patchy specks of calcification  SINUS OF VALSALVA: Normal averaging 27 mm  SINOTUBULAR JUNCTION: Grossly normal averaging 25 mm, areas of calcification  around the sinotubular junction and ostia of the coronary arteries    SUMMARY:  1.  Patent epicardial coronary arteries with areas of extraluminal calcification  is described above  2.  Grossly normal cardiac structures  3.  Trileaflet aortic valve mild sclerosis, normal caliber ascending aorta  4.  Coronary calcium score 14.  This places the patient in the OSMAN 47 risk  percentile for age and gender matched individuals.  5.  Overall quality of the scan was good.    Cardiac interpretation by Isabel Dockery M.D. on 7/19/2023.  ___________________________________________________________________________  This cardiac CT evaluation is a focused examination and does not include the  entire extent of structures listed below:  CT DOSE:  One or more dose reduction techniques (e.g. automated exposure  control, adjustment of the mA and/or kV according to patient size, use of  iterative reconstruction technique) utilized for this examination.    3)  NON-CARDIAC FINDINGS:    AORTA: Within normal limits.  PULMONARY VEINS: Within normal limits.  PULMONARY ARTERIES: Within normal limits.  SVC: Within normal limits.  IVC: Within normal limits.  LUNG GUEVARA: Dependent hypoventilatory changes are noted bilaterally. There is a  4 mm pleural-based nodule in the right middle lobe (series A6, image #34),  similar to the prior examination. Linear atelectasis or scarring is noted in the  lingula.  LYMPH NODES: Within normal limits.  OSSEOUS STRUCTURES: Diffuse osseous demineralization is noted. Multilevel  degenerative changes are noted in the visualized thoracic spine.  OTHER: The spleen is heterogeneous, likely on the basis of contrast phase.  "There  is a subcentimeter exophytic hypodensity in the upper pole of the left kidney,  too small to characterize.    --    Impression  1. See above cardiologist summary.  2. Noncardiac portions of the examination were unremarkable for acute findings..    CT chest interpretation by Layton Elder D.O. on 7/19/2023 3:59 PM.        Hematology  Lab Results   Component Value Date    WBC 6.66 02/21/2023    HGB 13.8 02/21/2023    HCT 41.7 02/21/2023     02/21/2023    INR 1.1 08/23/2022       Chemistries  Lab Results   Component Value Date     02/21/2023    K 4.3 02/21/2023     02/21/2023    CREATININE 0.9 07/13/2023    BUN 21 07/13/2023    CO2 25 02/21/2023    GLUCOSE 108 (H) 02/21/2023    CALCIUM 9.6 02/21/2023    MG 1.9 08/25/2022    ALT 19 08/23/2022    AST 29 08/23/2022       Cholesterol  No results found for: \"CHOL\", \"TRIG\", \"HDL\", \"LDLCALC\", \"NONHDLCALC\"    Endocrine  Lab Results   Component Value Date    TSH 1.23 08/23/2022           Assessment/Plan     Agatston coronary artery calcium score less than 100  CTA with nonobstructive coronary artery disease and a calcium score of 14.  I discussed with patient about the presence of coronary calcification.  We will continue atorvastatin and efforts for risk factor modification.  Recommend follow a cardiac diet and exercise as tolerated.    Other hyperlipidemia  During our last office visit we increased atorvastatin to 20 mg daily.  She did not have the repeat blood work done but agrees to have it done at some point.  Continue atorvastatin.    Primary hypertension  Blood pressure slightly elevated today, but patient did not take her blood pressure medication yet.  We decided to continue same medication regimen and low-salt diet.        Orders Placed This Encounter   Procedures    [facility] MUSE ECG 12 lead (clinic performed)     Scheduling Instructions:      PLEASE USE THIS ORDER FOR ECG'S PERFORMED IN PHYSICIAN OFFICES     Order Specific Question:   " Release to patient     Answer:   Immediate [1]            This note was completed in part utilizing a voice recognition software. Grammatical errors, random word insertion, spelling mistakes, and incomplete sentences may be an occasional consequence of the system secondary to software limitations, ambient noise and hardware issues.   Please read the chart carefully and recognize, using context, where substitutions have occurred. If you have any questions or concerns about the context, text or information contained within the body of this dictation, please contact myself, the provider, for further clarification.     I, Meek Mora, am scribing for, and in the presence of, Kana Karimi MD.      IKana MD, personally performed the services described in this documentation as scribed by Meek Mora in my presence, and it is both accurate and complete.       Kana Karimi MD  10/4/2023

## 2023-10-04 ENCOUNTER — OFFICE VISIT (OUTPATIENT)
Dept: CARDIOLOGY | Facility: CLINIC | Age: 73
End: 2023-10-04
Payer: COMMERCIAL

## 2023-10-04 VITALS
OXYGEN SATURATION: 95 % | BODY MASS INDEX: 25.1 KG/M2 | HEIGHT: 64 IN | HEART RATE: 75 BPM | DIASTOLIC BLOOD PRESSURE: 84 MMHG | SYSTOLIC BLOOD PRESSURE: 135 MMHG | WEIGHT: 147 LBS

## 2023-10-04 DIAGNOSIS — I49.3 PVC'S (PREMATURE VENTRICULAR CONTRACTIONS): ICD-10-CM

## 2023-10-04 DIAGNOSIS — R93.1 AGATSTON CORONARY ARTERY CALCIUM SCORE LESS THAN 100: ICD-10-CM

## 2023-10-04 DIAGNOSIS — R06.09 DYSPNEA ON EXERTION: ICD-10-CM

## 2023-10-04 DIAGNOSIS — E78.49 OTHER HYPERLIPIDEMIA: Primary | ICD-10-CM

## 2023-10-04 DIAGNOSIS — I10 PRIMARY HYPERTENSION: ICD-10-CM

## 2023-10-04 DIAGNOSIS — R06.02 SHORTNESS OF BREATH: ICD-10-CM

## 2023-10-04 LAB
ATRIAL RATE: 69
P AXIS: 52
PR INTERVAL: 152
QRS DURATION: 76
QT INTERVAL: 416
QTC CALCULATION(BAZETT): 445
R AXIS: 101
T WAVE AXIS: 31
VENTRICULAR RATE: 69

## 2023-10-04 PROCEDURE — 99213 OFFICE O/P EST LOW 20 MIN: CPT | Performed by: INTERNAL MEDICINE

## 2023-10-04 PROCEDURE — 93000 ELECTROCARDIOGRAM COMPLETE: CPT | Performed by: INTERNAL MEDICINE

## 2023-10-04 PROCEDURE — 3075F SYST BP GE 130 - 139MM HG: CPT | Performed by: INTERNAL MEDICINE

## 2023-10-04 PROCEDURE — 3008F BODY MASS INDEX DOCD: CPT | Performed by: INTERNAL MEDICINE

## 2023-10-04 PROCEDURE — 3079F DIAST BP 80-89 MM HG: CPT | Performed by: INTERNAL MEDICINE

## 2023-10-04 RX ORDER — PANTOPRAZOLE SODIUM 20 MG/1
20 TABLET, DELAYED RELEASE ORAL
COMMUNITY
Start: 2023-07-24 | End: 2025-01-08 | Stop reason: ALTCHOICE

## 2023-10-04 RX ORDER — LOSARTAN POTASSIUM 50 MG/1
50 TABLET ORAL DAILY
COMMUNITY
Start: 2023-08-02 | End: 2025-01-08 | Stop reason: SDUPTHER

## 2023-10-04 ASSESSMENT — ENCOUNTER SYMPTOMS
SHORTNESS OF BREATH: 0
NEAR-SYNCOPE: 0
PALPITATIONS: 0
IRREGULAR HEARTBEAT: 0
LIGHT-HEADEDNESS: 0
SYNCOPE: 0
DIZZINESS: 0
DYSPNEA ON EXERTION: 1

## 2023-10-04 NOTE — ASSESSMENT & PLAN NOTE
Blood pressure slightly elevated today, but patient did not take her blood pressure medication yet.  We decided to continue same medication regimen and low-salt diet.

## 2023-10-04 NOTE — ASSESSMENT & PLAN NOTE
CTA with nonobstructive coronary artery disease and a calcium score of 14.  I discussed with patient about the presence of coronary calcification.  We will continue atorvastatin and efforts for risk factor modification.  Recommend follow a cardiac diet and exercise as tolerated.

## 2023-10-04 NOTE — ASSESSMENT & PLAN NOTE
There are no clinical findings consistent with decompensated heart failure at this time.  Continue efforts for blood pressure control.

## 2023-10-04 NOTE — ASSESSMENT & PLAN NOTE
During our last office visit we increased atorvastatin to 20 mg daily.  She did not have the repeat blood work done but agrees to have it done at some point.  Continue atorvastatin.   done

## 2024-02-05 RX ORDER — DOXYCYCLINE HYCLATE 100 MG/1
CAPSULE, GELATIN COATED ORAL
Qty: 84 | Refills: 0 | Status: ERX

## 2024-03-15 NOTE — PROGRESS NOTES
Cardiology  Office Progress Note         Reason for visit:   Chief Complaint   Patient presents with    Follow-up       HPI     Rena Ramirez is a 74 y.o. female who presents to the office for cardiovascular follow up and management of CACS 14 (all in LAD) on 23, other HLD, and HTN.     She was last seen in our office on 10/4/23. She was to have lab work completed. I did not make any changes to her medical regimen. She was to follow up in 6 months.     Weight 147 lbs on 10/4.     Today, she reports shortness of breath with steps. She has lost 6 lbs since her last office visit. She reports she does not take her medication as prescribed. She will take her medications 1-2 times a week as she will forget to take her medications. She denies any chest pain, palpitations, dizziness, lightheadedness, near syncope or syncopal episodes.     Past Medical History:   Diagnosis Date    Alcoholism (CMS/HCC)     Allergic     Anxiety     Hypertension     Lipid disorder        Past Surgical History:   Procedure Laterality Date    REPLACEMENT TOTAL HIP LATERAL POSITION Right     TOTAL SHOULDER REPLACEMENT Right        Social History     Tobacco Use    Smoking status: Former     Packs/day: 1.00     Years: 13.00     Additional pack years: 0.00     Total pack years: 13.00     Types: Cigarettes     Quit date:      Years since quittin.3    Smokeless tobacco: Never    Tobacco comments:     smoked socially   Vaping Use    Vaping Use: Never used   Substance Use Topics    Alcohol use: Yes     Alcohol/week: 21.0 standard drinks of alcohol     Types: 21 Glasses of wine per week     Comment: Drinks 2-3 glasses of white wine daily since she was in her early 50s    Drug use: Never       Family History   Problem Relation Age of Onset    Heart disease Biological Father     Heart attack Biological Sister     Heart disease Biological Sister     Other Biological Sister         PPM    Heart disease Biological Brother         Allergies:  Morphine and Sulfa (sulfonamide antibiotics)    Current Outpatient Medications   Medication Sig Dispense Refill    atorvastatin (LIPITOR) 20 mg tablet Take 20 mg by mouth daily.      cholecalciferol, vitamin D3, 1,000 unit (25 mcg) tablet Take 1,000 Units by mouth daily.      coenzyme Q10 (COQ10) 100 mg capsule Take 100 mg by mouth daily. Occasional per patient.      cyanocobalamin (VITAMIN B12) 1,000 mcg tablet Take 1 tablet (1,000 mcg total) by mouth daily. 30 tablet 0    losartan (COZAAR) 50 mg tablet Take 50 mg by mouth daily.      pantoprazole (PROTONIX) 20 mg EC tablet Take 20 mg by mouth daily with breakfast.       No current facility-administered medications for this visit.       Review of Systems   Cardiovascular:  Positive for dyspnea on exertion. Negative for chest pain, irregular heartbeat, leg swelling, near-syncope, palpitations and syncope.   Respiratory:  Negative for shortness of breath.    Neurological:  Negative for dizziness and light-headedness.       Objective     Vitals:    04/10/24 1315   BP: (!) 158/98   Pulse: 91   SpO2: 96%       Wt Readings from Last 3 Encounters:   04/10/24 64 kg (141 lb)   10/04/23 66.7 kg (147 lb)   07/19/23 67.6 kg (149 lb)       Body mass index is 24.2 kg/m².  Body surface area is 1.7 meters squared.    Physical Exam  Constitutional:       Appearance: Normal appearance.   Neck:      Vascular: No carotid bruit or JVD.   Cardiovascular:      Rate and Rhythm: Normal rate and regular rhythm.      Pulses: Normal pulses.      Heart sounds: Normal heart sounds. No murmur heard.  Pulmonary:      Effort: Pulmonary effort is normal. No respiratory distress.      Breath sounds: Normal breath sounds. No wheezing or rales.   Musculoskeletal:         General: No swelling.      Right lower leg: No edema.      Left lower leg: No edema.   Skin:     General: Skin is warm and dry.   Neurological:      General: No focal deficit present.      Mental Status: She is alert  and oriented to person, place, and time.         ECG   Normal sinus rhythm   Normal ECG   When compared with ECG of 04-OCT-2023 14:15,   No significant change was found     Cardiac Imaging    TRANSTHORACIC ECHO (TTE) COMPLETE 07/11/2022    Interpretation Summary  · Normal-sized LV. Normal LV systolic function. Estimated EF 60- 65%. Wall motion appears grossly normal. Normal LV wall thickness. Normal diastolic filling pattern for age of the LV.  · Normal-sized LA.  · No evidence of pericardial effusion.  · Tricuspid valve structure is grossly normal. Mild to moderate tricuspid valve regurgitation.  · Estimated RVSP = 32 mmHg (assuming a right atrial pressure of 3 mmHg).  · Grossly normal pulmonic valve structure. Trace pulmonic valve regurgitation. No pulmonic valve stenosis.  · Tricuspid aortic valve. No aortic valve regurgitation. No aortic valve stenosis.  · Normal-sized RV. Normal RV systolic function.  · Normal-sized RA.  · Grossly normal leaflet structure of the mitral valve.  · No significant mitral valve regurgitation.  · No significant mitral valve stenosis.  · IVC not well visualized.  · No previous echocardiogram available for comparison.      CTA CORONARY ARTERY W IV CONTRAST 07/19/2023    Narrative  CLINICAL HISTORY: Dyspnea on exertion    COMMENT:  1).  PROCEDURE:  The patient was brought to the CT prep room in stable  condition.  The patient was n.p.o. for 4 hours.  An 20 gauge intracath IV was  started in the right antecubital space.  The patient's heart rate and blood  pressure were recorded.  Per protocol, Ivabradine  0 mgs was given orally  followed by Metoprolol 50  mgs given orally and additional Metoprolol 0 mgs  intravenously.   Sublingual Nitroglycerin was administered before contrast  injection.  Multi-detector CT of the chest/heart was then performed following 90  cc of Omnipaque 350 iodine contrast administered intravenously.  A prospective  ECG gated and 100  kVp technique was utilized to  limit radiation exposure to the  patient. Post processing for 3D evaluation was completed and sent to [employer] 3D workstation for interpretation. The overall quality of the scan was  good. The patient tolerated the procedure well and was discharged in stable  condition.    2)  CARDIAC FINDINGS:    CORONARY CALCIUM COMPOSITE AGATSTON SCORE:  14  LM: 0                    LAD: 14                    LCX: 0  RCA: 0  This places the patient in the OSMAN 47th risk percentile for age and gender  matched individuals.    CORONARY ARTERIES:    LEFT MAIN: Patent.    LAD:  Proximal: Patent. Small specks of extraluminal calcification noted  Mid: Patent.  Distal: Patent.  DIAG 1: Patent.      RAMUS: Patent.    LCX:  Proximal: Patent.  Mid: Patent.  Distal: Patent.  OM1:  Patent.  OM2:  Patent.    RCA:  Proximal: Patent.  Mid: Patent.  Distal: Patent.  RPDA: Patent.  RPLB: Patent.    DOMINANCE: Right.  **Lesion severity (stenosis):  Normal (patent), Minimal <30%, Mild 30-50%,  Moderate 50-70%, Severe 70-99%, Occluded >99%.    CARDIAC MORPHOLOGY:  LEFT VENTRICLE: Grossly normal  RIGHT VENTRICLE: Grossly normal  LEFT ATRIUM: Grossly normal, normal pulmonary vein configuration  LEFT ATRIAL APPENDAGE: No evidence of filling defects; limited assessment as  this is not a delayed enhancement study  RIGHT ATRIUM: Grossly normal  AORTIC VALVE: Trileaflet  MITRAL VALVE: Grossly normal  TRICUSPID VALVE: Grossly normal  PULMONIC VALVE: Grossly normal  PERICARDIUM: Grossly normal    ASCENDING THORACIC AORTA:Grossly normal measuring 30 x 31 mm  AORTIC ROOT: Grossly normal with patchy specks of calcification  SINUS OF VALSALVA: Normal averaging 27 mm  SINOTUBULAR JUNCTION: Grossly normal averaging 25 mm, areas of calcification  around the sinotubular junction and ostia of the coronary arteries    SUMMARY:  1.  Patent epicardial coronary arteries with areas of extraluminal calcification  is described above  2.  Grossly normal cardiac  structures  3.  Trileaflet aortic valve mild sclerosis, normal caliber ascending aorta  4.  Coronary calcium score 14.  This places the patient in the OSMAN 47 risk  percentile for age and gender matched individuals.  5.  Overall quality of the scan was good.    Cardiac interpretation by Isabel Dockery M.D. on 7/19/2023.  ___________________________________________________________________________  This cardiac CT evaluation is a focused examination and does not include the  entire extent of structures listed below:  CT DOSE:  One or more dose reduction techniques (e.g. automated exposure  control, adjustment of the mA and/or kV according to patient size, use of  iterative reconstruction technique) utilized for this examination.    3)  NON-CARDIAC FINDINGS:    AORTA: Within normal limits.  PULMONARY VEINS: Within normal limits.  PULMONARY ARTERIES: Within normal limits.  SVC: Within normal limits.  IVC: Within normal limits.  LUNG GUEVARA: Dependent hypoventilatory changes are noted bilaterally. There is a  4 mm pleural-based nodule in the right middle lobe (series A6, image #34),  similar to the prior examination. Linear atelectasis or scarring is noted in the  lingula.  LYMPH NODES: Within normal limits.  OSSEOUS STRUCTURES: Diffuse osseous demineralization is noted. Multilevel  degenerative changes are noted in the visualized thoracic spine.  OTHER: The spleen is heterogeneous, likely on the basis of contrast phase. There  is a subcentimeter exophytic hypodensity in the upper pole of the left kidney,  too small to characterize.    --    Impression  1. See above cardiologist summary.  2. Noncardiac portions of the examination were unremarkable for acute findings..    CT chest interpretation by Layton Elder D.O. on 7/19/2023 3:59 PM.      Hematology  Lab Results   Component Value Date    WBC 6.66 02/21/2023    HGB 13.8 02/21/2023    HCT 41.7 02/21/2023     02/21/2023    INR 1.1 08/23/2022       Chemistries  Lab  "Results   Component Value Date     02/21/2023    K 4.3 02/21/2023     02/21/2023    CREATININE 0.9 07/13/2023    BUN 21 07/13/2023    CO2 25 02/21/2023    GLUCOSE 108 (H) 02/21/2023    CALCIUM 9.6 02/21/2023    MG 1.9 08/25/2022    ALT 19 08/23/2022    AST 29 08/23/2022       Cholesterol  No results found for: \"CHOL\", \"TRIG\", \"HDL\", \"LDLCALC\", \"NONHDLCALC\"    Endocrine  Lab Results   Component Value Date    TSH 1.23 08/23/2022           Assessment/Plan     Agatston coronary artery calcium score less than 100  CTA with nonobstructive coronary artery disease and a calcium score of 14.  Appears clinically stable.  We will continue atorvastatin and efforts for risk factor modification.  Recommend follow a cardiac diet and exercise as tolerated.    Other hyperlipidemia  Tolerating statin, continue atorvastatin.  I do not have a recent lipid profile.  Check lipid profile with the next blood draw.  Patient was counseled to take the medication as prescribed to decrease risk of cardiovascular events.    PVC's (premature ventricular contractions)  Denies any significant palpitations since previous visit.  Minimize use of alcohol.  Check some basic labs.    Primary hypertension  Blood pressure is uncontrolled today.  Patient does not take the losartan regularly.  I discussed with patient importance of controlling the blood pressure to decrease the risk of cardiovascular events and death.  She agrees to start taking the losartan, she will follow her primary care physician and have blood pressure rechecked.    Dyspnea on exertion  Denies any significant changes.  She reports being able to do her activity of daily living without any cardiac symptoms.  Continue efforts for blood pressure control.  Follow low-salt diet and avoid alcohol.        Orders Placed This Encounter   Procedures    Comprehensive metabolic panel     Standing Status:   Future     Standing Expiration Date:   4/10/2025     Order Specific Question:   " Release to patient     Answer:   Immediate     Order Specific Question:   Release to patient     Answer:   Immediate [1]    Lipid panel     Standing Status:   Future     Standing Expiration Date:   4/10/2025     Order Specific Question:   Release to patient     Answer:   Immediate     Order Specific Question:   Release to patient     Answer:   Immediate [1]    Magnesium     Standing Status:   Future     Standing Expiration Date:   4/10/2025     Order Specific Question:   Release to patient     Answer:   Immediate     Order Specific Question:   Release to patient     Answer:   Immediate [1]    [facility] MUSE ECG 12 lead (clinic performed)     Scheduling Instructions:      PLEASE USE THIS ORDER FOR ECG'S PERFORMED IN PHYSICIAN OFFICES     Order Specific Question:   Release to patient     Answer:   Immediate [1]          This note was completed in part utilizing a voice recognition software. Grammatical errors, random word insertion, spelling mistakes, and incomplete sentences may be an occasional consequence of the system secondary to software limitations, ambient noise and hardware issues.   Please read the chart carefully and recognize, using context, where substitutions have occurred. If you have any questions or concerns about the context, text or information contained within the body of this dictation, please contact myself, the provider, for further clarification.     I, Meek Mora, am scribing for, and in the presence of, Kana Karimi MD.      IKana MD, personally performed the services described in this documentation as scribed by Meek Mora in my presence, and it is both accurate and complete.       Kana Karimi MD  4/10/2024

## 2024-04-10 ENCOUNTER — OFFICE VISIT (OUTPATIENT)
Dept: CARDIOLOGY | Facility: CLINIC | Age: 74
End: 2024-04-10
Payer: COMMERCIAL

## 2024-04-10 VITALS
OXYGEN SATURATION: 96 % | HEIGHT: 64 IN | SYSTOLIC BLOOD PRESSURE: 158 MMHG | BODY MASS INDEX: 24.07 KG/M2 | HEART RATE: 91 BPM | WEIGHT: 141 LBS | DIASTOLIC BLOOD PRESSURE: 98 MMHG

## 2024-04-10 DIAGNOSIS — I10 PRIMARY HYPERTENSION: Primary | ICD-10-CM

## 2024-04-10 DIAGNOSIS — R06.09 DYSPNEA ON EXERTION: ICD-10-CM

## 2024-04-10 DIAGNOSIS — R93.1 AGATSTON CORONARY ARTERY CALCIUM SCORE LESS THAN 100: ICD-10-CM

## 2024-04-10 DIAGNOSIS — I49.3 PVC'S (PREMATURE VENTRICULAR CONTRACTIONS): ICD-10-CM

## 2024-04-10 DIAGNOSIS — E78.49 OTHER HYPERLIPIDEMIA: ICD-10-CM

## 2024-04-10 LAB
ATRIAL RATE: 91
P AXIS: 60
PR INTERVAL: 150
QRS DURATION: 76
QT INTERVAL: 376
QTC CALCULATION(BAZETT): 462
R AXIS: 53
T WAVE AXIS: 51
VENTRICULAR RATE: 91

## 2024-04-10 PROCEDURE — 3080F DIAST BP >= 90 MM HG: CPT | Performed by: INTERNAL MEDICINE

## 2024-04-10 PROCEDURE — 3077F SYST BP >= 140 MM HG: CPT | Performed by: INTERNAL MEDICINE

## 2024-04-10 PROCEDURE — 3008F BODY MASS INDEX DOCD: CPT | Performed by: INTERNAL MEDICINE

## 2024-04-10 PROCEDURE — 93000 ELECTROCARDIOGRAM COMPLETE: CPT | Performed by: INTERNAL MEDICINE

## 2024-04-10 PROCEDURE — 99214 OFFICE O/P EST MOD 30 MIN: CPT | Performed by: INTERNAL MEDICINE

## 2024-04-10 RX ORDER — ATORVASTATIN CALCIUM 20 MG/1
20 TABLET, FILM COATED ORAL DAILY
COMMUNITY
End: 2025-01-08 | Stop reason: SDUPTHER

## 2024-04-10 ASSESSMENT — ENCOUNTER SYMPTOMS
PALPITATIONS: 0
DIZZINESS: 0
IRREGULAR HEARTBEAT: 0
SYNCOPE: 0
SHORTNESS OF BREATH: 0
NEAR-SYNCOPE: 0
LIGHT-HEADEDNESS: 0
DYSPNEA ON EXERTION: 1

## 2024-04-10 NOTE — ASSESSMENT & PLAN NOTE
CTA with nonobstructive coronary artery disease and a calcium score of 14.  Appears clinically stable.  We will continue atorvastatin and efforts for risk factor modification.  Recommend follow a cardiac diet and exercise as tolerated.

## 2024-04-10 NOTE — ASSESSMENT & PLAN NOTE
Blood pressure is uncontrolled today.  Patient does not take the losartan regularly.  I discussed with patient importance of controlling the blood pressure to decrease the risk of cardiovascular events and death.  She agrees to start taking the losartan, she will follow her primary care physician and have blood pressure rechecked.

## 2024-04-10 NOTE — ASSESSMENT & PLAN NOTE
Tolerating statin, continue atorvastatin.  I do not have a recent lipid profile.  Check lipid profile with the next blood draw.  Patient was counseled to take the medication as prescribed to decrease risk of cardiovascular events.

## 2024-04-10 NOTE — LETTER
April 10, 2024     Gee Massey MD  [address] [facility] [address]    Patient: Rena Ramirez  YOB: 1950  Date of Visit: 4/10/2024      Dear Dr. Massey:    Thank you for referring Rena Ramirez to me for evaluation. Below are my notes for this consultation.    If you have questions, please do not hesitate to call me. I look forward to following your patient along with you.         Sincerely,        Kana Karimi MD        CC: No Recipients    Kana Karimi MD  4/10/2024  1:55 PM  Sign when Signing Visit       Cardiology  Office Progress Note         Reason for visit:   Chief Complaint   Patient presents with   • Follow-up       HPI    Rena Ramirez is a 74 y.o. female who presents to the office for cardiovascular follow up and management of CACS 14 (all in LAD) on 23, other HLD, and HTN.     She was last seen in our office on 10/4/23. She was to have lab work completed. I did not make any changes to her medical regimen. She was to follow up in 6 months.     Weight 147 lbs on 10/4.     Today, she reports shortness of breath with steps. She has lost 6 lbs since her last office visit. She reports she does not take her medication as prescribed. She will take her medications 1-2 times a week as she will forget to take her medications. She denies any chest pain, palpitations, dizziness, lightheadedness, near syncope or syncopal episodes.     Past Medical History:   Diagnosis Date   • Alcoholism (CMS/HCC)    • Allergic    • Anxiety    • Hypertension    • Lipid disorder        Past Surgical History:   Procedure Laterality Date   • REPLACEMENT TOTAL HIP LATERAL POSITION Right    • TOTAL SHOULDER REPLACEMENT Right        Social History     Tobacco Use   • Smoking status: Former     Packs/day: 1.00     Years: 13.00     Additional pack years: 0.00     Total pack years: 13.00     Types: Cigarettes     Quit date:      Years since quittin.3   • Smokeless tobacco: Never   • Tobacco  comments:     smoked socially   Vaping Use   • Vaping Use: Never used   Substance Use Topics   • Alcohol use: Yes     Alcohol/week: 21.0 standard drinks of alcohol     Types: 21 Glasses of wine per week     Comment: Drinks 2-3 glasses of white wine daily since she was in her early 50s   • Drug use: Never       Family History   Problem Relation Age of Onset   • Heart disease Biological Father    • Heart attack Biological Sister    • Heart disease Biological Sister    • Other Biological Sister         PPM   • Heart disease Biological Brother        Allergies:  Morphine and Sulfa (sulfonamide antibiotics)    Current Outpatient Medications   Medication Sig Dispense Refill   • atorvastatin (LIPITOR) 20 mg tablet Take 20 mg by mouth daily.     • cholecalciferol, vitamin D3, 1,000 unit (25 mcg) tablet Take 1,000 Units by mouth daily.     • coenzyme Q10 (COQ10) 100 mg capsule Take 100 mg by mouth daily. Occasional per patient.     • cyanocobalamin (VITAMIN B12) 1,000 mcg tablet Take 1 tablet (1,000 mcg total) by mouth daily. 30 tablet 0   • losartan (COZAAR) 50 mg tablet Take 50 mg by mouth daily.     • pantoprazole (PROTONIX) 20 mg EC tablet Take 20 mg by mouth daily with breakfast.       No current facility-administered medications for this visit.       Review of Systems   Cardiovascular:  Positive for dyspnea on exertion. Negative for chest pain, irregular heartbeat, leg swelling, near-syncope, palpitations and syncope.   Respiratory:  Negative for shortness of breath.    Neurological:  Negative for dizziness and light-headedness.       Objective    Vitals:    04/10/24 1315   BP: (!) 158/98   Pulse: 91   SpO2: 96%       Wt Readings from Last 3 Encounters:   04/10/24 64 kg (141 lb)   10/04/23 66.7 kg (147 lb)   07/19/23 67.6 kg (149 lb)       Body mass index is 24.2 kg/m².  Body surface area is 1.7 meters squared.    Physical Exam  Constitutional:       Appearance: Normal appearance.   Neck:      Vascular: No carotid  bruit or JVD.   Cardiovascular:      Rate and Rhythm: Normal rate and regular rhythm.      Pulses: Normal pulses.      Heart sounds: Normal heart sounds. No murmur heard.  Pulmonary:      Effort: Pulmonary effort is normal. No respiratory distress.      Breath sounds: Normal breath sounds. No wheezing or rales.   Musculoskeletal:         General: No swelling.      Right lower leg: No edema.      Left lower leg: No edema.   Skin:     General: Skin is warm and dry.   Neurological:      General: No focal deficit present.      Mental Status: She is alert and oriented to person, place, and time.         ECG   Normal sinus rhythm   Normal ECG   When compared with ECG of 04-OCT-2023 14:15,   No significant change was found     Cardiac Imaging    TRANSTHORACIC ECHO (TTE) COMPLETE 07/11/2022    Interpretation Summary  · Normal-sized LV. Normal LV systolic function. Estimated EF 60- 65%. Wall motion appears grossly normal. Normal LV wall thickness. Normal diastolic filling pattern for age of the LV.  · Normal-sized LA.  · No evidence of pericardial effusion.  · Tricuspid valve structure is grossly normal. Mild to moderate tricuspid valve regurgitation.  · Estimated RVSP = 32 mmHg (assuming a right atrial pressure of 3 mmHg).  · Grossly normal pulmonic valve structure. Trace pulmonic valve regurgitation. No pulmonic valve stenosis.  · Tricuspid aortic valve. No aortic valve regurgitation. No aortic valve stenosis.  · Normal-sized RV. Normal RV systolic function.  · Normal-sized RA.  · Grossly normal leaflet structure of the mitral valve.  · No significant mitral valve regurgitation.  · No significant mitral valve stenosis.  · IVC not well visualized.  · No previous echocardiogram available for comparison.      CTA CORONARY ARTERY W IV CONTRAST 07/19/2023    Narrative  CLINICAL HISTORY: Dyspnea on exertion    COMMENT:  1).  PROCEDURE:  The patient was brought to the CT prep room in stable  condition.  The patient was n.p.o. for  4 hours.  An 20 gauge intracath IV was  started in the right antecubital space.  The patient's heart rate and blood  pressure were recorded.  Per protocol, Ivabradine  0 mgs was given orally  followed by Metoprolol 50  mgs given orally and additional Metoprolol 0 mgs  intravenously.   Sublingual Nitroglycerin was administered before contrast  injection.  Multi-detector CT of the chest/heart was then performed following 90  cc of Omnipaque 350 iodine contrast administered intravenously.  A prospective  ECG gated and 100  kVp technique was utilized to limit radiation exposure to the  patient. Post processing for 3D evaluation was completed and sent to [employer] 3D workstation for interpretation. The overall quality of the scan was  good. The patient tolerated the procedure well and was discharged in stable  condition.    2)  CARDIAC FINDINGS:    CORONARY CALCIUM COMPOSITE AGATSTON SCORE:  14  LM: 0                    LAD: 14                    LCX: 0  RCA: 0  This places the patient in the OSMAN 47th risk percentile for age and gender  matched individuals.    CORONARY ARTERIES:    LEFT MAIN: Patent.    LAD:  Proximal: Patent. Small specks of extraluminal calcification noted  Mid: Patent.  Distal: Patent.  DIAG 1: Patent.      RAMUS: Patent.    LCX:  Proximal: Patent.  Mid: Patent.  Distal: Patent.  OM1:  Patent.  OM2:  Patent.    RCA:  Proximal: Patent.  Mid: Patent.  Distal: Patent.  RPDA: Patent.  RPLB: Patent.    DOMINANCE: Right.  **Lesion severity (stenosis):  Normal (patent), Minimal <30%, Mild 30-50%,  Moderate 50-70%, Severe 70-99%, Occluded >99%.    CARDIAC MORPHOLOGY:  LEFT VENTRICLE: Grossly normal  RIGHT VENTRICLE: Grossly normal  LEFT ATRIUM: Grossly normal, normal pulmonary vein configuration  LEFT ATRIAL APPENDAGE: No evidence of filling defects; limited assessment as  this is not a delayed enhancement study  RIGHT ATRIUM: Grossly normal  AORTIC VALVE: Trileaflet  MITRAL VALVE: Grossly  normal  TRICUSPID VALVE: Grossly normal  PULMONIC VALVE: Grossly normal  PERICARDIUM: Grossly normal    ASCENDING THORACIC AORTA:Grossly normal measuring 30 x 31 mm  AORTIC ROOT: Grossly normal with patchy specks of calcification  SINUS OF VALSALVA: Normal averaging 27 mm  SINOTUBULAR JUNCTION: Grossly normal averaging 25 mm, areas of calcification  around the sinotubular junction and ostia of the coronary arteries    SUMMARY:  1.  Patent epicardial coronary arteries with areas of extraluminal calcification  is described above  2.  Grossly normal cardiac structures  3.  Trileaflet aortic valve mild sclerosis, normal caliber ascending aorta  4.  Coronary calcium score 14.  This places the patient in the OSMAN 47 risk  percentile for age and gender matched individuals.  5.  Overall quality of the scan was good.    Cardiac interpretation by Isabel Dockery M.D. on 7/19/2023.  ___________________________________________________________________________  This cardiac CT evaluation is a focused examination and does not include the  entire extent of structures listed below:  CT DOSE:  One or more dose reduction techniques (e.g. automated exposure  control, adjustment of the mA and/or kV according to patient size, use of  iterative reconstruction technique) utilized for this examination.    3)  NON-CARDIAC FINDINGS:    AORTA: Within normal limits.  PULMONARY VEINS: Within normal limits.  PULMONARY ARTERIES: Within normal limits.  SVC: Within normal limits.  IVC: Within normal limits.  LUNG GUEVARA: Dependent hypoventilatory changes are noted bilaterally. There is a  4 mm pleural-based nodule in the right middle lobe (series A6, image #34),  similar to the prior examination. Linear atelectasis or scarring is noted in the  lingula.  LYMPH NODES: Within normal limits.  OSSEOUS STRUCTURES: Diffuse osseous demineralization is noted. Multilevel  degenerative changes are noted in the visualized thoracic spine.  OTHER: The spleen is  "heterogeneous, likely on the basis of contrast phase. There  is a subcentimeter exophytic hypodensity in the upper pole of the left kidney,  too small to characterize.    --    Impression  1. See above cardiologist summary.  2. Noncardiac portions of the examination were unremarkable for acute findings..    CT chest interpretation by Layton Elder D.O. on 7/19/2023 3:59 PM.      Hematology  Lab Results   Component Value Date    WBC 6.66 02/21/2023    HGB 13.8 02/21/2023    HCT 41.7 02/21/2023     02/21/2023    INR 1.1 08/23/2022       Chemistries  Lab Results   Component Value Date     02/21/2023    K 4.3 02/21/2023     02/21/2023    CREATININE 0.9 07/13/2023    BUN 21 07/13/2023    CO2 25 02/21/2023    GLUCOSE 108 (H) 02/21/2023    CALCIUM 9.6 02/21/2023    MG 1.9 08/25/2022    ALT 19 08/23/2022    AST 29 08/23/2022       Cholesterol  No results found for: \"CHOL\", \"TRIG\", \"HDL\", \"LDLCALC\", \"NONHDLCALC\"    Endocrine  Lab Results   Component Value Date    TSH 1.23 08/23/2022           Assessment/Plan    Agatston coronary artery calcium score less than 100  CTA with nonobstructive coronary artery disease and a calcium score of 14.  Appears clinically stable.  We will continue atorvastatin and efforts for risk factor modification.  Recommend follow a cardiac diet and exercise as tolerated.    Other hyperlipidemia  Tolerating statin, continue atorvastatin.  I do not have a recent lipid profile.  Check lipid profile with the next blood draw.  Patient was counseled to take the medication as prescribed to decrease risk of cardiovascular events.    PVC's (premature ventricular contractions)  Denies any significant palpitations since previous visit.  Minimize use of alcohol.  Check some basic labs.    Primary hypertension  Blood pressure is uncontrolled today.  Patient does not take the losartan regularly.  I discussed with patient importance of controlling the blood pressure to decrease the risk of " cardiovascular events and death.  She agrees to start taking the losartan, she will follow her primary care physician and have blood pressure rechecked.    Dyspnea on exertion  Denies any significant changes.  She reports being able to do her activity of daily living without any cardiac symptoms.  Continue efforts for blood pressure control.  Follow low-salt diet and avoid alcohol.        Orders Placed This Encounter   Procedures   • Comprehensive metabolic panel     Standing Status:   Future     Standing Expiration Date:   4/10/2025     Order Specific Question:   Release to patient     Answer:   Immediate     Order Specific Question:   Release to patient     Answer:   Immediate [1]   • Lipid panel     Standing Status:   Future     Standing Expiration Date:   4/10/2025     Order Specific Question:   Release to patient     Answer:   Immediate     Order Specific Question:   Release to patient     Answer:   Immediate [1]   • Magnesium     Standing Status:   Future     Standing Expiration Date:   4/10/2025     Order Specific Question:   Release to patient     Answer:   Immediate     Order Specific Question:   Release to patient     Answer:   Immediate [1]   • [facility] MUSE ECG 12 lead (clinic performed)     Scheduling Instructions:      PLEASE USE THIS ORDER FOR ECG'S PERFORMED IN PHYSICIAN OFFICES     Order Specific Question:   Release to patient     Answer:   Immediate [1]          This note was completed in part utilizing a voice recognition software. Grammatical errors, random word insertion, spelling mistakes, and incomplete sentences may be an occasional consequence of the system secondary to software limitations, ambient noise and hardware issues.   Please read the chart carefully and recognize, using context, where substitutions have occurred. If you have any questions or concerns about the context, text or information contained within the body of this dictation, please contact myself, the provider, for further  clarification.     I, Meek Mora, am scribing for, and in the presence of, Kana Karimi MD.      I, Kana Karimi MD, personally performed the services described in this documentation as scribed by Meek Mora in my presence, and it is both accurate and complete.       Kana Karimi MD  4/10/2024

## 2024-04-10 NOTE — ASSESSMENT & PLAN NOTE
Denies any significant palpitations since previous visit.  Minimize use of alcohol.  Check some basic labs.

## 2024-04-10 NOTE — ASSESSMENT & PLAN NOTE
Denies any significant changes.  She reports being able to do her activity of daily living without any cardiac symptoms.  Continue efforts for blood pressure control.  Follow low-salt diet and avoid alcohol.

## 2024-04-16 ENCOUNTER — TRANSCRIBE ORDERS (OUTPATIENT)
Dept: SCHEDULING | Age: 74
End: 2024-04-16

## 2024-04-17 ENCOUNTER — TRANSCRIBE ORDERS (OUTPATIENT)
Dept: REGISTRATION | Facility: HOSPITAL | Age: 74
End: 2024-04-17

## 2024-04-17 DIAGNOSIS — R47.9 UNSPECIFIED SPEECH DISTURBANCES: Primary | ICD-10-CM

## 2024-05-21 ENCOUNTER — HOSPITAL ENCOUNTER (OUTPATIENT)
Dept: RADIOLOGY | Facility: HOSPITAL | Age: 74
Discharge: HOME | End: 2024-05-21
Attending: STUDENT IN AN ORGANIZED HEALTH CARE EDUCATION/TRAINING PROGRAM
Payer: COMMERCIAL

## 2024-05-21 DIAGNOSIS — R47.9 UNSPECIFIED SPEECH DISTURBANCES: ICD-10-CM

## 2024-05-21 PROCEDURE — 70450 CT HEAD/BRAIN W/O DYE: CPT

## 2024-12-19 NOTE — PROGRESS NOTES
Cardiology  Office Progress Note         Reason for visit:   Chief Complaint   Patient presents with    Follow-up       HPI     Rena Ramirez is a 74 y.o. female who presents to the office for cardiovascular follow up and management.   and son present with her.    She was last seen in our office on 4/10/24. I ordered a repeat FLP. She agreed to start taking the Losartan and was to follow up with her PCP to have her BP rechecked. I did not make any changes. She was to follow up in 6 months. Weight 141 lbs.     Since last office visit:   Cancelled 10/16/24 appointment with me at [facility].   She did not have repeat lab work.   5/2/24, follow up with PCP. Noted to have tripped and fell and hit her head on the bed. BP at this visit was 138/81. She was to follow up for a CT head.   CT head 5/21/24.     AHA Life Essential 8’s   Diet: A pamphlet from the National Lipid Association with lifestyle tips to decrease the LDL cholesterol was given to patient.   Activity: She does not exercise currently.   Sleep: No issues reported    Weight: She has lost 5 lbs since her last office visit.   Cholesterol: 3/21/23 FLP: , TG 91, HDL 69,   Diabetes: Follow-up with PCP  Blood pressure:  We discussed about the guideline recommended goal systolic blood pressure less than 120 mmHg and diastolic blood pressure less than 80 mmHg. We discussed about the proper way of checking the blood pressure and a video from the American Heart Association was shown to patient. She does not monitor her BP at home.   Smoking:  Former smoker    Atherosclerosis counseling:   Counseled.      Anticoagulation:   No AC therapy       Past Medical History:   Diagnosis Date    Alcoholism (CMS/HCC)     Allergic     Anxiety     Hypertension     Lipid disorder        Past Surgical History   Procedure Laterality Date    Replacement total hip lateral position Right     Total shoulder replacement Right        Social History     Tobacco Use     Smoking status: Former     Current packs/day: 0.00     Average packs/day: 1 pack/day for 13.0 years (13.0 ttl pk-yrs)     Types: Cigarettes     Start date:      Quit date:      Years since quittin.0    Smokeless tobacco: Never    Tobacco comments:     smoked socially   Vaping Use    Vaping status: Never Used   Substance Use Topics    Alcohol use: Yes     Alcohol/week: 21.0 standard drinks of alcohol     Types: 21 Glasses of wine per week     Comment: Drinks 2-3 glasses of white wine daily since she was in her early 50s    Drug use: Never       Family History   Problem Relation Name Age of Onset    Heart disease Biological Father      Heart attack Biological Sister      Heart disease Biological Sister      Other Biological Sister          PPM    Heart disease Biological Brother         Allergies:  Morphine and Sulfa (sulfonamide antibiotics)    Current Outpatient Medications   Medication Sig Dispense Refill    atorvastatin (LIPITOR) 20 mg tablet Take 1 tablet (20 mg total) by mouth daily. 90 tablet 3    losartan (COZAAR) 25 mg tablet Take 1 tablet (25 mg total) by mouth daily. 90 tablet 3    cyanocobalamin (VITAMIN B12) 1,000 mcg tablet Take 1 tablet (1,000 mcg total) by mouth daily. (Patient not taking: Reported on 2025) 30 tablet 0     No current facility-administered medications for this visit.       Medications changes:   23, switched Simvastatin to Atorvastatin 10 mg daily   23, increased Atorvastatin to 20 mg daily.     ROS Her son Isiah has accompanied her to the visit. She denies any chest pain, palpitations, dizziness, or lightheadedness. She will have occasional shortness of breath with steps. She notes LLE edema.     Objective     Vitals:    25 1207   BP: 132/88   Pulse: 97   SpO2: 98%       Wt Readings from Last 3 Encounters:   25 61.7 kg (136 lb)   04/10/24 64 kg (141 lb)   10/04/23 66.7 kg (147 lb)       Body mass index is 23.34 kg/m².  Body surface area is 1.67  meters squared.    Physical Exam  Constitutional:       Appearance: Normal appearance.   Neck:      Vascular: No carotid bruit or JVD.   Cardiovascular:      Rate and Rhythm: Normal rate and regular rhythm.      Pulses: Normal pulses.      Heart sounds: Normal heart sounds. No murmur heard.  Pulmonary:      Effort: Pulmonary effort is normal. No respiratory distress.      Breath sounds: Normal breath sounds. No wheezing or rales.   Musculoskeletal:         General: No swelling.      Right lower leg: No edema.      Left lower leg: No edema.   Skin:     General: Skin is warm and dry.   Neurological:      General: No focal deficit present.      Mental Status: She is alert and oriented to person, place, and time.         ECG   Normal sinus rhythm  Left posterior fascicular block  Abnormal ECG       Cardiac Imaging   TRANSTHORACIC ECHO (TTE) COMPLETE 07/11/2022  Interpretation Summary  · Normal-sized LV. Normal LV systolic function. Estimated EF 60- 65%. Wall motion appears grossly normal. Normal LV wall thickness. Normal diastolic filling pattern for age of the LV.  · Normal-sized LA.  · No evidence of pericardial effusion.  · Tricuspid valve structure is grossly normal. Mild to moderate tricuspid valve regurgitation.  · Estimated RVSP = 32 mmHg (assuming a right atrial pressure of 3 mmHg).  · Grossly normal pulmonic valve structure. Trace pulmonic valve regurgitation. No pulmonic valve stenosis.  · Tricuspid aortic valve. No aortic valve regurgitation. No aortic valve stenosis.  · Normal-sized RV. Normal RV systolic function.  · Normal-sized RA.  · Grossly normal leaflet structure of the mitral valve.  · No significant mitral valve regurgitation.  · No significant mitral valve stenosis.  · IVC not well visualized.  · No previous echocardiogram available for comparison.    CT C/A/P 2/21/2023  LM CAC    CTA Coronary Artery 7/19/2023    Brief Cardiac History:   CACS 14 (all in LAD on 7/19/23)  Other HLD  HTN  ETOH use      Other:   She was on higher doses of simvastatin but she developed muscle cramps.   Nonadherent to medications. Per patient on 1/8/25, stopped taking medications 4/2024  History of alcohol abuse, continues to drink alcohol  Agrees to monitor blood pressure twice daily as described in the video from the American Heart Association website, keep a log and send it to me in a week  Agrees to have blood work done        Labs:   Lab Results   Component Value Date     02/21/2023    K 4.3 02/21/2023    MG 1.9 08/25/2022    BUN 21 07/13/2023    CREATININE 0.9 07/13/2023    EGFR >60.0 07/13/2023    WBC 6.66 02/21/2023    HGB 13.8 02/21/2023     02/21/2023    AST 29 08/23/2022    ALT 19 08/23/2022    GLUCOSE 108 (H) 02/21/2023    TSH 1.23 08/23/2022    INR 1.1 08/23/2022    HSTROPONINI 2.9 08/23/2022       Assessment/Plan     Other hyperlipidemia  Since previous visit she decided to stop the atorvastatin.  I discussed with her the reason why she is on atorvastatin, the evidence of coronary plaques on the CT, the risk of cardiovascular events.  She agrees to restart atorvastatin, prescription was sent to her pharmacy.  She knows to call our office at any time if she has any issues with medications.  She did not have blood work done as requested during last office visit, she agrees to have it done now.    PVC's (premature ventricular contractions)  Denies any significant palpitations since previous visit.  Check some basic labs.    Agatston coronary artery calcium score less than 100  We discussed again about this finding.  She reports a good functional capacity without any cardiac symptoms.  She agrees to restart atorvastatin.  Will continue efforts for blood pressure control.  Recommend follow a cardiac diet and exercise as tolerated.    Alcoholism (CMS/[facility])  She reports that she continues to drink alcohol.  With her history of alcohol abuse I advised complete abstinence from alcohol.    Primary  hypertension  Blood pressure is elevated today.  Patient is willing to purchase a blood pressure device and monitor her blood pressure at home.  I advised her to follow low-salt diet and avoid NSAIDs.  I advised her to be abstinent from alcohol.  She stopped the losartan on her own for no reason, she agrees to restart losartan, a prescription for losartan 25 mg daily was sent to her pharmacy.  We discussed about the guideline recommended goal systolic blood pressure less than 120 mmHg and diastolic blood pressure less than 80 mmHg. We discussed about the proper way of checking the blood pressure and a video from the American Heart Association was shown to patient.  Patient will monitor the blood pressure and let me know in 1 week.        Orders Placed This Encounter   Procedures    Comprehensive metabolic panel     Standing Status:   Future     Standing Expiration Date:   1/8/2026     Order Specific Question:   Release to patient     Answer:   Immediate     Order Specific Question:   Release to patient     Answer:   Immediate [1]    Lipid panel     Standing Status:   Future     Standing Expiration Date:   1/8/2026     Order Specific Question:   Release to patient     Answer:   Immediate     Order Specific Question:   Release to patient     Answer:   Immediate [1]    Magnesium     Standing Status:   Future     Standing Expiration Date:   1/8/2026     Order Specific Question:   Release to patient     Answer:   Immediate     Order Specific Question:   Release to patient     Answer:   Immediate [1]    Lipoprotein (a)     Standing Status:   Future     Standing Expiration Date:   1/8/2026     Order Specific Question:   Release to patient     Answer:   Immediate     Order Specific Question:   Release to patient     Answer:   Immediate [1]    [facility] MUSE ECG 12 lead (clinic performed)     Scheduling Instructions:      PLEASE USE THIS ORDER FOR ECG'S PERFORMED IN PHYSICIAN OFFICES     Order Specific Question:   Release to  patient     Answer:   Immediate [1]        I attest that this visit supports the complexity inherent to evaluation and management associated with medical care services that serve as the continuing focal point for all needed health care services and/or medical care services that are part of ongoing care related to this patient's single, serious condition or a complex condition.    Return in about 6 months (around 7/8/2025).     This note was completed in part utilizing a voice recognition software. Grammatical errors, random word insertion, spelling mistakes, and incomplete sentences may be an occasional consequence of the system secondary to software limitations, ambient noise and hardware issues.   Please read the chart carefully and recognize, using context, where substitutions have occurred. If you have any questions or concerns about the context, text or information contained within the body of this dictation, please contact myself, the provider, for further clarification.     I, Meek Mora, am scribing for, and in the presence of, Kana Karimi MD.      I, Kana Karimi MD, personally performed the services described in this documentation as scribed by Meek Mora in my presence, and it is both accurate and complete.         Kana Karimi MD  1/8/2025

## 2025-01-08 ENCOUNTER — OFFICE VISIT (OUTPATIENT)
Dept: CARDIOLOGY | Facility: CLINIC | Age: 75
End: 2025-01-08
Payer: COMMERCIAL

## 2025-01-08 VITALS
HEIGHT: 64 IN | HEART RATE: 97 BPM | DIASTOLIC BLOOD PRESSURE: 88 MMHG | BODY MASS INDEX: 23.22 KG/M2 | OXYGEN SATURATION: 98 % | SYSTOLIC BLOOD PRESSURE: 132 MMHG | WEIGHT: 136 LBS

## 2025-01-08 DIAGNOSIS — I49.3 PVC'S (PREMATURE VENTRICULAR CONTRACTIONS): ICD-10-CM

## 2025-01-08 DIAGNOSIS — R93.1 AGATSTON CORONARY ARTERY CALCIUM SCORE LESS THAN 100: ICD-10-CM

## 2025-01-08 DIAGNOSIS — I10 PRIMARY HYPERTENSION: Primary | ICD-10-CM

## 2025-01-08 DIAGNOSIS — F10.20 ALCOHOLISM (CMS/HCC): ICD-10-CM

## 2025-01-08 DIAGNOSIS — E78.49 OTHER HYPERLIPIDEMIA: ICD-10-CM

## 2025-01-08 LAB
ATRIAL RATE: 79
P AXIS: 39
PR INTERVAL: 140
QRS DURATION: 68
QT INTERVAL: 384
QTC CALCULATION(BAZETT): 440
R AXIS: 114
T WAVE AXIS: 12
VENTRICULAR RATE: 79

## 2025-01-08 PROCEDURE — 3075F SYST BP GE 130 - 139MM HG: CPT | Performed by: INTERNAL MEDICINE

## 2025-01-08 PROCEDURE — 99214 OFFICE O/P EST MOD 30 MIN: CPT | Performed by: INTERNAL MEDICINE

## 2025-01-08 PROCEDURE — G22XX PR COMPLEXITY INHERENT TO E&M -ADD ON NON-BILLABLE: HCPCS | Performed by: INTERNAL MEDICINE

## 2025-01-08 PROCEDURE — 93000 ELECTROCARDIOGRAM COMPLETE: CPT | Performed by: INTERNAL MEDICINE

## 2025-01-08 PROCEDURE — 3008F BODY MASS INDEX DOCD: CPT | Performed by: INTERNAL MEDICINE

## 2025-01-08 PROCEDURE — 3079F DIAST BP 80-89 MM HG: CPT | Performed by: INTERNAL MEDICINE

## 2025-01-08 RX ORDER — ATORVASTATIN CALCIUM 20 MG/1
20 TABLET, FILM COATED ORAL DAILY
Qty: 90 TABLET | Refills: 3 | Status: SHIPPED | OUTPATIENT
Start: 2025-01-08

## 2025-01-08 RX ORDER — LOSARTAN POTASSIUM 25 MG/1
25 TABLET ORAL DAILY
Qty: 90 TABLET | Refills: 3 | Status: SHIPPED | OUTPATIENT
Start: 2025-01-08

## 2025-01-08 NOTE — ASSESSMENT & PLAN NOTE
Since previous visit she decided to stop the atorvastatin.  I discussed with her the reason why she is on atorvastatin, the evidence of coronary plaques on the CT, the risk of cardiovascular events.  She agrees to restart atorvastatin, prescription was sent to her pharmacy.  She knows to call our office at any time if she has any issues with medications.  She did not have blood work done as requested during last office visit, she agrees to have it done now.

## 2025-01-08 NOTE — LETTER
January 8, 2025     Gee Massey MD  [address] [facility] [address]    Patient: Rena Ramirez  YOB: 1950  Date of Visit: 1/8/2025      Dear Dr. Massey:    Thank you for referring Rena Ramirez to me for evaluation. Below are my notes for this consultation.    If you have questions, please do not hesitate to call me. I look forward to following your patient along with you.         Sincerely,        Kana Karimi MD        CC: No Recipients    Kana Karimi MD  1/8/2025  1:21 PM  Sign when Signing Visit       Cardiology  Office Progress Note         Reason for visit:   Chief Complaint   Patient presents with   • Follow-up       HPI    Rena Ramirez is a 74 y.o. female who presents to the office for cardiovascular follow up and management.   and son present with her.    She was last seen in our office on 4/10/24. I ordered a repeat FLP. She agreed to start taking the Losartan and was to follow up with her PCP to have her BP rechecked. I did not make any changes. She was to follow up in 6 months. Weight 141 lbs.     Since last office visit:   Cancelled 10/16/24 appointment with me at [place] [place].   She did not have repeat lab work.   5/2/24, follow up with PCP. Noted to have tripped and fell and hit her head on the bed. BP at this visit was 138/81. She was to follow up for a CT head.   CT head 5/21/24.     AHA Life Essential 8’s   Diet: A pamphlet from the National Lipid Association with lifestyle tips to decrease the LDL cholesterol was given to patient.   Activity: She does not exercise currently.   Sleep: No issues reported    Weight: She has lost 5 lbs since her last office visit.   Cholesterol: 3/21/23 FLP: , TG 91, HDL 69,   Diabetes: Follow-up with PCP  Blood pressure:  We discussed about the guideline recommended goal systolic blood pressure less than 120 mmHg and diastolic blood pressure less than 80 mmHg. We discussed about the proper way of checking the blood  pressure and a video from the American Heart Association was shown to patient. She does not monitor her BP at home.   Smoking:  Former smoker    Atherosclerosis counseling:   Counseled.      Anticoagulation:   No AC therapy       Past Medical History:   Diagnosis Date   • Alcoholism (CMS/HCC)    • Allergic    • Anxiety    • Hypertension    • Lipid disorder        Past Surgical History   Procedure Laterality Date   • Replacement total hip lateral position Right    • Total shoulder replacement Right        Social History     Tobacco Use   • Smoking status: Former     Current packs/day: 0.00     Average packs/day: 1 pack/day for 13.0 years (13.0 ttl pk-yrs)     Types: Cigarettes     Start date:      Quit date:      Years since quittin.0   • Smokeless tobacco: Never   • Tobacco comments:     smoked socially   Vaping Use   • Vaping status: Never Used   Substance Use Topics   • Alcohol use: Yes     Alcohol/week: 21.0 standard drinks of alcohol     Types: 21 Glasses of wine per week     Comment: Drinks 2-3 glasses of white wine daily since she was in her early 50s   • Drug use: Never       Family History   Problem Relation Name Age of Onset   • Heart disease Biological Father     • Heart attack Biological Sister     • Heart disease Biological Sister     • Other Biological Sister          PPM   • Heart disease Biological Brother         Allergies:  Morphine and Sulfa (sulfonamide antibiotics)    Current Outpatient Medications   Medication Sig Dispense Refill   • atorvastatin (LIPITOR) 20 mg tablet Take 1 tablet (20 mg total) by mouth daily. 90 tablet 3   • losartan (COZAAR) 25 mg tablet Take 1 tablet (25 mg total) by mouth daily. 90 tablet 3   • cyanocobalamin (VITAMIN B12) 1,000 mcg tablet Take 1 tablet (1,000 mcg total) by mouth daily. (Patient not taking: Reported on 2025) 30 tablet 0     No current facility-administered medications for this visit.       Medications changes:   23, switched  Simvastatin to Atorvastatin 10 mg daily   6/21/23, increased Atorvastatin to 20 mg daily.     ROS Her son Isiah has accompanied her to the visit. She denies any chest pain, palpitations, dizziness, or lightheadedness. She will have occasional shortness of breath with steps. She notes LLE edema.     Objective    Vitals:    01/08/25 1207   BP: 132/88   Pulse: 97   SpO2: 98%       Wt Readings from Last 3 Encounters:   01/08/25 61.7 kg (136 lb)   04/10/24 64 kg (141 lb)   10/04/23 66.7 kg (147 lb)       Body mass index is 23.34 kg/m².  Body surface area is 1.67 meters squared.    Physical Exam  Constitutional:       Appearance: Normal appearance.   Neck:      Vascular: No carotid bruit or JVD.   Cardiovascular:      Rate and Rhythm: Normal rate and regular rhythm.      Pulses: Normal pulses.      Heart sounds: Normal heart sounds. No murmur heard.  Pulmonary:      Effort: Pulmonary effort is normal. No respiratory distress.      Breath sounds: Normal breath sounds. No wheezing or rales.   Musculoskeletal:         General: No swelling.      Right lower leg: No edema.      Left lower leg: No edema.   Skin:     General: Skin is warm and dry.   Neurological:      General: No focal deficit present.      Mental Status: She is alert and oriented to person, place, and time.         ECG   Normal sinus rhythm  Left posterior fascicular block  Abnormal ECG       Cardiac Imaging   TRANSTHORACIC ECHO (TTE) COMPLETE 07/11/2022  Interpretation Summary  · Normal-sized LV. Normal LV systolic function. Estimated EF 60- 65%. Wall motion appears grossly normal. Normal LV wall thickness. Normal diastolic filling pattern for age of the LV.  · Normal-sized LA.  · No evidence of pericardial effusion.  · Tricuspid valve structure is grossly normal. Mild to moderate tricuspid valve regurgitation.  · Estimated RVSP = 32 mmHg (assuming a right atrial pressure of 3 mmHg).  · Grossly normal pulmonic valve structure. Trace pulmonic valve  regurgitation. No pulmonic valve stenosis.  · Tricuspid aortic valve. No aortic valve regurgitation. No aortic valve stenosis.  · Normal-sized RV. Normal RV systolic function.  · Normal-sized RA.  · Grossly normal leaflet structure of the mitral valve.  · No significant mitral valve regurgitation.  · No significant mitral valve stenosis.  · IVC not well visualized.  · No previous echocardiogram available for comparison.    CT C/A/P 2/21/2023  LM CAC    CTA Coronary Artery 7/19/2023    Brief Cardiac History:   CACS 14 (all in LAD on 7/19/23)  Other HLD  HTN  ETOH use     Other:   She was on higher doses of simvastatin but she developed muscle cramps.   Nonadherent to medications. Per patient on 1/8/25, stopped taking medications 4/2024  History of alcohol abuse, continues to drink alcohol  Agrees to monitor blood pressure twice daily as described in the video from the American Heart Association website, keep a log and send it to me in a week  Agrees to have blood work done        Labs:   Lab Results   Component Value Date     02/21/2023    K 4.3 02/21/2023    MG 1.9 08/25/2022    BUN 21 07/13/2023    CREATININE 0.9 07/13/2023    EGFR >60.0 07/13/2023    WBC 6.66 02/21/2023    HGB 13.8 02/21/2023     02/21/2023    AST 29 08/23/2022    ALT 19 08/23/2022    GLUCOSE 108 (H) 02/21/2023    TSH 1.23 08/23/2022    INR 1.1 08/23/2022    HSTROPONINI 2.9 08/23/2022       Assessment/Plan    Other hyperlipidemia  Since previous visit she decided to stop the atorvastatin.  I discussed with her the reason why she is on atorvastatin, the evidence of coronary plaques on the CT, the risk of cardiovascular events.  She agrees to restart atorvastatin, prescription was sent to her pharmacy.  She knows to call our office at any time if she has any issues with medications.  She did not have blood work done as requested during last office visit, she agrees to have it done now.    PVC's (premature ventricular  contractions)  Denies any significant palpitations since previous visit.  Check some basic labs.    Agatston coronary artery calcium score less than 100  We discussed again about this finding.  She reports a good functional capacity without any cardiac symptoms.  She agrees to restart atorvastatin.  Will continue efforts for blood pressure control.  Recommend follow a cardiac diet and exercise as tolerated.    Alcoholism (CMS/[facility])  She reports that she continues to drink alcohol.  With her history of alcohol abuse I advised complete abstinence from alcohol.    Primary hypertension  Blood pressure is elevated today.  Patient is willing to purchase a blood pressure device and monitor her blood pressure at home.  I advised her to follow low-salt diet and avoid NSAIDs.  I advised her to be abstinent from alcohol.  She stopped the losartan on her own for no reason, she agrees to restart losartan, a prescription for losartan 25 mg daily was sent to her pharmacy.  We discussed about the guideline recommended goal systolic blood pressure less than 120 mmHg and diastolic blood pressure less than 80 mmHg. We discussed about the proper way of checking the blood pressure and a video from the American Heart Association was shown to patient.  Patient will monitor the blood pressure and let me know in 1 week.        Orders Placed This Encounter   Procedures   • Comprehensive metabolic panel     Standing Status:   Future     Standing Expiration Date:   1/8/2026     Order Specific Question:   Release to patient     Answer:   Immediate     Order Specific Question:   Release to patient     Answer:   Immediate [1]   • Lipid panel     Standing Status:   Future     Standing Expiration Date:   1/8/2026     Order Specific Question:   Release to patient     Answer:   Immediate     Order Specific Question:   Release to patient     Answer:   Immediate [1]   • Magnesium     Standing Status:   Future     Standing Expiration Date:   1/8/2026      Order Specific Question:   Release to patient     Answer:   Immediate     Order Specific Question:   Release to patient     Answer:   Immediate [1]   • Lipoprotein (a)     Standing Status:   Future     Standing Expiration Date:   1/8/2026     Order Specific Question:   Release to patient     Answer:   Immediate     Order Specific Question:   Release to patient     Answer:   Immediate [1]   • [facility] MUSE ECG 12 lead (clinic performed)     Scheduling Instructions:      PLEASE USE THIS ORDER FOR ECG'S PERFORMED IN PHYSICIAN OFFICES     Order Specific Question:   Release to patient     Answer:   Immediate [1]        I attest that this visit supports the complexity inherent to evaluation and management associated with medical care services that serve as the continuing focal point for all needed health care services and/or medical care services that are part of ongoing care related to this patient's single, serious condition or a complex condition.    Return in about 6 months (around 7/8/2025).     This note was completed in part utilizing a voice recognition software. Grammatical errors, random word insertion, spelling mistakes, and incomplete sentences may be an occasional consequence of the system secondary to software limitations, ambient noise and hardware issues.   Please read the chart carefully and recognize, using context, where substitutions have occurred. If you have any questions or concerns about the context, text or information contained within the body of this dictation, please contact myself, the provider, for further clarification.     I, Meek Mora, am scribing for, and in the presence of, Kana Karimi MD.      IKana MD, personally performed the services described in this documentation as scribed by Meek Mora in my presence, and it is both accurate and complete.         Kana Karimi MD  1/8/2025

## 2025-01-08 NOTE — ASSESSMENT & PLAN NOTE
Blood pressure is elevated today.  Patient is willing to purchase a blood pressure device and monitor her blood pressure at home.  I advised her to follow low-salt diet and avoid NSAIDs.  I advised her to be abstinent from alcohol.  She stopped the losartan on her own for no reason, she agrees to restart losartan, a prescription for losartan 25 mg daily was sent to her pharmacy.  We discussed about the guideline recommended goal systolic blood pressure less than 120 mmHg and diastolic blood pressure less than 80 mmHg. We discussed about the proper way of checking the blood pressure and a video from the American Heart Association was shown to patient.  Patient will monitor the blood pressure and let me know in 1 week.

## 2025-01-08 NOTE — ASSESSMENT & PLAN NOTE
We discussed again about this finding.  She reports a good functional capacity without any cardiac symptoms.  She agrees to restart atorvastatin.  Will continue efforts for blood pressure control.  Recommend follow a cardiac diet and exercise as tolerated.

## 2025-01-08 NOTE — ASSESSMENT & PLAN NOTE
She reports that she continues to drink alcohol.  With her history of alcohol abuse I advised complete abstinence from alcohol.

## 2025-07-21 PROBLEM — F41.9 ANXIETY: Status: RESOLVED | Noted: 2022-08-23 | Resolved: 2025-07-21

## 2025-07-21 NOTE — PROGRESS NOTES
"     Cardiology  Office Progress Note         Reason for visit:   Chief Complaint   Patient presents with    Follow-up       HPI     Rena Ramirez is a 75 y.o. female who presents to the office for cardiovascular follow up and management of HTN, HLD, CAD- via CAC, and PVCs. Other PMH includes EOTH use.    The patient was last seen 25. She was to resume her statin. BP also elevated and she was going to start monitoring at home. Otherwise stable.     Today:   She presents today with her  and her son. She has not been taking her medications consistently, she often does not take them. She has not been checking her blood pressure at home and returned the blood pressure cuff her son got her. She does not actively exercise. She \"states she is always doing something\". She otherwise offers no cardiac complaints.       Past Medical History:   Diagnosis Date    Alcoholism (CMS/HCC)     Allergic     Anxiety     Hypertension     Lipid disorder        Past Surgical History   Procedure Laterality Date    Replacement total hip lateral position Right     Total shoulder replacement Right        Social History     Tobacco Use    Smoking status: Former     Current packs/day: 0.00     Average packs/day: 1 pack/day for 13.0 years (13.0 ttl pk-yrs)     Types: Cigarettes     Start date:      Quit date:      Years since quittin.5    Smokeless tobacco: Never    Tobacco comments:     smoked socially   Vaping Use    Vaping status: Never Used   Substance Use Topics    Alcohol use: Yes     Alcohol/week: 21.0 standard drinks of alcohol     Types: 21 Glasses of wine per week     Comment: Drinks 2-3 glasses of white wine daily since she was in her early 50s    Drug use: Never       Family History   Problem Relation Name Age of Onset    Heart disease Biological Father      Heart attack Biological Sister      Heart disease Biological Sister      Other Biological Sister          PPM    Heart disease Biological Brother   "       Allergies:  Morphine and Sulfa (sulfonamide antibiotics)    Current Outpatient Medications   Medication Sig Dispense Refill    atorvastatin (LIPITOR) 20 mg tablet Take 1 tablet (20 mg total) by mouth daily. 90 tablet 3    losartan (COZAAR) 25 mg tablet Take 1 tablet (25 mg total) by mouth daily. 90 tablet 3    cyanocobalamin (VITAMIN B12) 1,000 mcg tablet Take 1 tablet (1,000 mcg total) by mouth daily. (Patient not taking: Reported on 1/8/2025) 30 tablet 0     No current facility-administered medications for this visit.       Review of Systems   Cardiovascular:  Negative for chest pain, dyspnea on exertion, leg swelling, near-syncope, orthopnea, palpitations, paroxysmal nocturnal dyspnea and syncope.   Respiratory:  Negative for shortness of breath.    Neurological:  Negative for dizziness and light-headedness.       Objective     Vitals:    07/22/25 1529   BP: (!) 134/90   Pulse:    SpO2:        Wt Readings from Last 5 Encounters:   07/22/25 66.7 kg (147 lb)   01/08/25 61.7 kg (136 lb)   04/10/24 64 kg (141 lb)   10/04/23 66.7 kg (147 lb)   07/19/23 67.6 kg (149 lb)       Body mass index is 25.23 kg/m².    Physical Exam  HENT:      Head: Normocephalic.   Eyes:      Extraocular Movements: Extraocular movements intact.   Neck:      Vascular: No JVD.   Cardiovascular:      Rate and Rhythm: Normal rate and regular rhythm.      Heart sounds: No murmur heard.  Pulmonary:      Breath sounds: Normal breath sounds. No wheezing, rhonchi or rales.   Abdominal:      Palpations: Abdomen is soft.   Musculoskeletal:         General: No swelling.   Skin:     General: Skin is warm.   Neurological:      Mental Status: She is alert.          ECG   Sinus rhythm with Premature atrial complexes with Aberrant conduction   Otherwise normal ECG     Hematology  Lab Results   Component Value Date    WBC 6.66 02/21/2023    HGB 13.8 02/21/2023    HCT 41.7 02/21/2023     02/21/2023    INR 1.1 08/23/2022       Chemistries  Lab  "Results   Component Value Date     02/21/2023    K 4.3 02/21/2023     02/21/2023    CREATININE 0.9 07/13/2023    BUN 21 07/13/2023    CO2 25 02/21/2023    GLUCOSE 108 (H) 02/21/2023    CALCIUM 9.6 02/21/2023    MG 1.9 08/25/2022    ALT 19 08/23/2022    AST 29 08/23/2022       Cholesterol  No results found for: \"CHOL\", \"TRIG\", \"HDL\", \"LDLCALC\", \"NONHDLCALC\"    Endocrine  Lab Results   Component Value Date    TSH 1.23 08/23/2022       Cardiac Imaging    TRANSTHORACIC ECHO (TTE) COMPLETE 07/11/2022    Interpretation Summary  · Normal-sized LV. Normal LV systolic function. Estimated EF 60- 65%. Wall motion appears grossly normal. Normal LV wall thickness. Normal diastolic filling pattern for age of the LV.  · Normal-sized LA.  · No evidence of pericardial effusion.  · Tricuspid valve structure is grossly normal. Mild to moderate tricuspid valve regurgitation.  · Estimated RVSP = 32 mmHg (assuming a right atrial pressure of 3 mmHg).  · Grossly normal pulmonic valve structure. Trace pulmonic valve regurgitation. No pulmonic valve stenosis.  · Tricuspid aortic valve. No aortic valve regurgitation. No aortic valve stenosis.  · Normal-sized RV. Normal RV systolic function.  · Normal-sized RA.  · Grossly normal leaflet structure of the mitral valve.  · No significant mitral valve regurgitation.  · No significant mitral valve stenosis.  · IVC not well visualized.  · No previous echocardiogram available for comparison.          Assessment/Plan     Agatston coronary artery calcium score less than 100  CTA coronary 7/2023: 14 (LAD 14)Patent epicardial coronary arteries with areas of extraluminal calcification   TTE 7/2022: EF 60-65% NWMA. Mild to mod TR. Trace MR. NL RV  - Denies any symptoms indicative of angina  - Continue medical management including statin therapy    Other hyperlipidemia  LDL goal <70  - Again discussed compliance with medication. She is willing to resume her statin. FLP 6-8 weeks.         Follow " Up Plans:  Return in about 6 months (around 1/22/2026).              I attest that this visit supports the complexity inherent to evaluation and management associated with medical care services that serve as the continuing focal point for all needed health care services and/or medical care services that are part of ongoing care related to this patient´s single, serious, or complex condition.    FROYLAN Cobb  7/22/2025

## 2025-07-22 ENCOUNTER — OFFICE VISIT (OUTPATIENT)
Dept: CARDIOLOGY | Facility: CLINIC | Age: 75
End: 2025-07-22
Payer: COMMERCIAL

## 2025-07-22 VITALS
SYSTOLIC BLOOD PRESSURE: 134 MMHG | HEIGHT: 64 IN | DIASTOLIC BLOOD PRESSURE: 90 MMHG | BODY MASS INDEX: 25.1 KG/M2 | WEIGHT: 147 LBS | HEART RATE: 68 BPM | OXYGEN SATURATION: 97 %

## 2025-07-22 DIAGNOSIS — R93.1 AGATSTON CORONARY ARTERY CALCIUM SCORE LESS THAN 100: ICD-10-CM

## 2025-07-22 DIAGNOSIS — I49.3 PVC'S (PREMATURE VENTRICULAR CONTRACTIONS): ICD-10-CM

## 2025-07-22 DIAGNOSIS — I10 PRIMARY HYPERTENSION: Primary | ICD-10-CM

## 2025-07-22 LAB
ATRIAL RATE: 69
P AXIS: 49
PR INTERVAL: 154
QRS DURATION: 82
QT INTERVAL: 428
QTC CALCULATION(BAZETT): 458
R AXIS: 81
T WAVE AXIS: 27
VENTRICULAR RATE: 69

## 2025-07-22 PROCEDURE — 99214 OFFICE O/P EST MOD 30 MIN: CPT

## 2025-07-22 PROCEDURE — 3075F SYST BP GE 130 - 139MM HG: CPT

## 2025-07-22 PROCEDURE — 93000 ELECTROCARDIOGRAM COMPLETE: CPT

## 2025-07-22 PROCEDURE — 3008F BODY MASS INDEX DOCD: CPT

## 2025-07-22 PROCEDURE — 3080F DIAST BP >= 90 MM HG: CPT

## 2025-07-22 RX ORDER — ATORVASTATIN CALCIUM 20 MG/1
20 TABLET, FILM COATED ORAL DAILY
Qty: 90 TABLET | Refills: 3 | Status: SHIPPED | OUTPATIENT
Start: 2025-07-22

## 2025-07-22 RX ORDER — LOSARTAN POTASSIUM 25 MG/1
25 TABLET ORAL DAILY
Qty: 90 TABLET | Refills: 3 | Status: SHIPPED | OUTPATIENT
Start: 2025-07-22

## 2025-07-22 ASSESSMENT — ENCOUNTER SYMPTOMS
DIZZINESS: 0
ORTHOPNEA: 0
SYNCOPE: 0
PND: 0
LIGHT-HEADEDNESS: 0
SHORTNESS OF BREATH: 0
DYSPNEA ON EXERTION: 0
PALPITATIONS: 0
NEAR-SYNCOPE: 0

## 2025-07-22 NOTE — ASSESSMENT & PLAN NOTE
CTA coronary 7/2023: 14 (LAD 14)Patent epicardial coronary arteries with areas of extraluminal calcification   TTE 7/2022: EF 60-65% NWMA. Mild to mod TR. Trace MR. NL RV  - Denies any symptoms indicative of angina  - Continue medical management including statin therapy

## 2025-07-22 NOTE — LETTER
"2025     Gee Massey MD  [address] [facility] [address]    Patient: Rena Ramirez  YOB: 1950  Date of Visit: 2025      Dear Dr. Massey:    Thank you for referring Rena Ramirez to me for evaluation. Below are my notes for this consultation.    If you have questions, please do not hesitate to call me. I look forward to following your patient along with you.         Sincerely,        FROYLAN Cobb        CC: MD Cornelius Ding Cindy, CRNP  2025  4:06 PM  Sign when Signing Visit       Cardiology  Office Progress Note         Reason for visit:   Chief Complaint   Patient presents with   • Follow-up       HPI    Rena Ramirez is a 75 y.o. female who presents to the office for cardiovascular follow up and management of HTN, HLD, CAD- via CAC, and PVCs. Other PMH includes EOTH use.    The patient was last seen 25. She was to resume her statin. BP also elevated and she was going to start monitoring at home. Otherwise stable.     Today:   She presents today with her  and her son. She has not been taking her medications consistently, she often does not take them. She has not been checking her blood pressure at home and returned the blood pressure cuff her son got her. She does not actively exercise. She \"states she is always doing something\". She otherwise offers no cardiac complaints.       Past Medical History:   Diagnosis Date   • Alcoholism (CMS/HCC)    • Allergic    • Anxiety    • Hypertension    • Lipid disorder        Past Surgical History   Procedure Laterality Date   • Replacement total hip lateral position Right    • Total shoulder replacement Right        Social History     Tobacco Use   • Smoking status: Former     Current packs/day: 0.00     Average packs/day: 1 pack/day for 13.0 years (13.0 ttl pk-yrs)     Types: Cigarettes     Start date:      Quit date:      Years since quittin.5   • Smokeless tobacco: Never   • Tobacco " comments:     smoked socially   Vaping Use   • Vaping status: Never Used   Substance Use Topics   • Alcohol use: Yes     Alcohol/week: 21.0 standard drinks of alcohol     Types: 21 Glasses of wine per week     Comment: Drinks 2-3 glasses of white wine daily since she was in her early 50s   • Drug use: Never       Family History   Problem Relation Name Age of Onset   • Heart disease Biological Father     • Heart attack Biological Sister     • Heart disease Biological Sister     • Other Biological Sister          PPM   • Heart disease Biological Brother         Allergies:  Morphine and Sulfa (sulfonamide antibiotics)    Current Outpatient Medications   Medication Sig Dispense Refill   • atorvastatin (LIPITOR) 20 mg tablet Take 1 tablet (20 mg total) by mouth daily. 90 tablet 3   • losartan (COZAAR) 25 mg tablet Take 1 tablet (25 mg total) by mouth daily. 90 tablet 3   • cyanocobalamin (VITAMIN B12) 1,000 mcg tablet Take 1 tablet (1,000 mcg total) by mouth daily. (Patient not taking: Reported on 1/8/2025) 30 tablet 0     No current facility-administered medications for this visit.       Review of Systems   Cardiovascular:  Negative for chest pain, dyspnea on exertion, leg swelling, near-syncope, orthopnea, palpitations, paroxysmal nocturnal dyspnea and syncope.   Respiratory:  Negative for shortness of breath.    Neurological:  Negative for dizziness and light-headedness.       Objective    Vitals:    07/22/25 1529   BP: (!) 134/90   Pulse:    SpO2:        Wt Readings from Last 5 Encounters:   07/22/25 66.7 kg (147 lb)   01/08/25 61.7 kg (136 lb)   04/10/24 64 kg (141 lb)   10/04/23 66.7 kg (147 lb)   07/19/23 67.6 kg (149 lb)       Body mass index is 25.23 kg/m².    Physical Exam  HENT:      Head: Normocephalic.   Eyes:      Extraocular Movements: Extraocular movements intact.   Neck:      Vascular: No JVD.   Cardiovascular:      Rate and Rhythm: Normal rate and regular rhythm.      Heart sounds: No murmur  "heard.  Pulmonary:      Breath sounds: Normal breath sounds. No wheezing, rhonchi or rales.   Abdominal:      Palpations: Abdomen is soft.   Musculoskeletal:         General: No swelling.   Skin:     General: Skin is warm.   Neurological:      Mental Status: She is alert.          ECG   Sinus rhythm with Premature atrial complexes with Aberrant conduction   Otherwise normal ECG     Hematology  Lab Results   Component Value Date    WBC 6.66 02/21/2023    HGB 13.8 02/21/2023    HCT 41.7 02/21/2023     02/21/2023    INR 1.1 08/23/2022       Chemistries  Lab Results   Component Value Date     02/21/2023    K 4.3 02/21/2023     02/21/2023    CREATININE 0.9 07/13/2023    BUN 21 07/13/2023    CO2 25 02/21/2023    GLUCOSE 108 (H) 02/21/2023    CALCIUM 9.6 02/21/2023    MG 1.9 08/25/2022    ALT 19 08/23/2022    AST 29 08/23/2022       Cholesterol  No results found for: \"CHOL\", \"TRIG\", \"HDL\", \"LDLCALC\", \"NONHDLCALC\"    Endocrine  Lab Results   Component Value Date    TSH 1.23 08/23/2022       Cardiac Imaging    TRANSTHORACIC ECHO (TTE) COMPLETE 07/11/2022    Interpretation Summary  · Normal-sized LV. Normal LV systolic function. Estimated EF 60- 65%. Wall motion appears grossly normal. Normal LV wall thickness. Normal diastolic filling pattern for age of the LV.  · Normal-sized LA.  · No evidence of pericardial effusion.  · Tricuspid valve structure is grossly normal. Mild to moderate tricuspid valve regurgitation.  · Estimated RVSP = 32 mmHg (assuming a right atrial pressure of 3 mmHg).  · Grossly normal pulmonic valve structure. Trace pulmonic valve regurgitation. No pulmonic valve stenosis.  · Tricuspid aortic valve. No aortic valve regurgitation. No aortic valve stenosis.  · Normal-sized RV. Normal RV systolic function.  · Normal-sized RA.  · Grossly normal leaflet structure of the mitral valve.  · No significant mitral valve regurgitation.  · No significant mitral valve stenosis.  · IVC not well " visualized.  · No previous echocardiogram available for comparison.          Assessment/Plan    Agatston coronary artery calcium score less than 100  CTA coronary 7/2023: 14 (LAD 14)Patent epicardial coronary arteries with areas of extraluminal calcification   TTE 7/2022: EF 60-65% NWMA. Mild to mod TR. Trace MR. NL RV  - Denies any symptoms indicative of angina  - Continue medical management including statin therapy    Other hyperlipidemia  LDL goal <70  - Again discussed compliance with medication. She is willing to resume her statin. FLP 6-8 weeks.         Follow Up Plans:  Return in about 6 months (around 1/22/2026).              I attest that this visit supports the complexity inherent to evaluation and management associated with medical care services that serve as the continuing focal point for all needed health care services and/or medical care services that are part of ongoing care related to this patient´s single, serious, or complex condition.    FROYLAN Cobb  7/22/2025

## 2025-07-22 NOTE — ASSESSMENT & PLAN NOTE
LDL goal <70  - Again discussed compliance with medication. She is willing to resume her statin. FLP 6-8 weeks.

## 2025-08-11 ENCOUNTER — HOSPITAL ENCOUNTER (EMERGENCY)
Facility: HOSPITAL | Age: 75
Discharge: HOME | End: 2025-08-11
Attending: EMERGENCY MEDICINE | Admitting: EMERGENCY MEDICINE
Payer: COMMERCIAL

## 2025-08-11 ENCOUNTER — APPOINTMENT (EMERGENCY)
Dept: RADIOLOGY | Facility: HOSPITAL | Age: 75
End: 2025-08-11
Payer: COMMERCIAL

## 2025-08-11 VITALS
HEIGHT: 64 IN | HEART RATE: 107 BPM | WEIGHT: 147 LBS | SYSTOLIC BLOOD PRESSURE: 159 MMHG | OXYGEN SATURATION: 98 % | TEMPERATURE: 98.4 F | DIASTOLIC BLOOD PRESSURE: 91 MMHG | RESPIRATION RATE: 18 BRPM | BODY MASS INDEX: 25.1 KG/M2
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
## 2025-08-11 DIAGNOSIS — S09.90XA HEAD INJURY, INITIAL ENCOUNTER: Primary | ICD-10-CM

## 2025-08-11 PROCEDURE — 99284 EMERGENCY DEPT VISIT MOD MDM: CPT | Mod: 25

## 2025-08-11 PROCEDURE — 63600000 HC DRUGS/DETAIL CODE: Performed by: PHYSICIAN ASSISTANT

## 2025-08-11 PROCEDURE — 90471 IMMUNIZATION ADMIN: CPT | Performed by: PHYSICIAN ASSISTANT

## 2025-08-11 PROCEDURE — 3E0234Z INTRODUCTION OF SERUM, TOXOID AND VACCINE INTO MUSCLE, PERCUTANEOUS APPROACH: ICD-10-PCS | Performed by: EMERGENCY MEDICINE

## 2025-08-11 PROCEDURE — 70450 CT HEAD/BRAIN W/O DYE: CPT

## 2025-08-11 PROCEDURE — 90715 TDAP VACCINE 7 YRS/> IM: CPT | Performed by: PHYSICIAN ASSISTANT

## 2025-08-11 RX ADMIN — TETANUS TOXOID, REDUCED DIPHTHERIA TOXOID AND ACELLULAR PERTUSSIS VACCINE, ADSORBED 0.5 ML: 5; 2.5; 8; 8; 2.5 SUSPENSION INTRAMUSCULAR at 18:08

## 2025-08-11 ASSESSMENT — ENCOUNTER SYMPTOMS
WOUND: 1
ARTHRALGIAS: 0
NUMBNESS: 0
HEADACHES: 0
JOINT SWELLING: 0
COLOR CHANGE: 0
ABDOMINAL PAIN: 0
VOMITING: 0
COUGH: 0
DIARRHEA: 0
SHORTNESS OF BREATH: 0
FEVER: 0
WEAKNESS: 0